# Patient Record
Sex: MALE | Race: WHITE | NOT HISPANIC OR LATINO | Employment: OTHER | URBAN - METROPOLITAN AREA
[De-identification: names, ages, dates, MRNs, and addresses within clinical notes are randomized per-mention and may not be internally consistent; named-entity substitution may affect disease eponyms.]

---

## 2017-01-26 ENCOUNTER — LAB CONVERSION - ENCOUNTER (OUTPATIENT)
Dept: OTHER | Facility: OTHER | Age: 82
End: 2017-01-26

## 2017-01-26 ENCOUNTER — GENERIC CONVERSION - ENCOUNTER (OUTPATIENT)
Dept: OTHER | Facility: OTHER | Age: 82
End: 2017-01-26

## 2017-01-26 ENCOUNTER — APPOINTMENT (OUTPATIENT)
Dept: LAB | Facility: CLINIC | Age: 82
End: 2017-01-26
Payer: MEDICARE

## 2017-01-26 DIAGNOSIS — Z79.01 LONG TERM (CURRENT) USE OF ANTICOAGULANTS: ICD-10-CM

## 2017-01-26 LAB
INR PPP: 2.55 (ref 0.86–1.16)
PROTHROMBIN TIME: 27 SECONDS (ref 12–14.3)

## 2017-01-26 PROCEDURE — 36415 COLL VENOUS BLD VENIPUNCTURE: CPT

## 2017-01-26 PROCEDURE — 85610 PROTHROMBIN TIME: CPT

## 2017-02-23 ENCOUNTER — GENERIC CONVERSION - ENCOUNTER (OUTPATIENT)
Dept: OTHER | Facility: OTHER | Age: 82
End: 2017-02-23

## 2017-02-23 ENCOUNTER — APPOINTMENT (OUTPATIENT)
Dept: LAB | Facility: CLINIC | Age: 82
End: 2017-02-23
Payer: MEDICARE

## 2017-02-23 ENCOUNTER — TRANSCRIBE ORDERS (OUTPATIENT)
Dept: LAB | Facility: CLINIC | Age: 82
End: 2017-02-23

## 2017-02-23 DIAGNOSIS — Z79.01 LONG TERM (CURRENT) USE OF ANTICOAGULANTS: ICD-10-CM

## 2017-02-23 LAB
INR PPP: 2.24 (ref 0.86–1.16)
PROTHROMBIN TIME: 24.5 SECONDS (ref 12–14.3)

## 2017-02-23 PROCEDURE — 36415 COLL VENOUS BLD VENIPUNCTURE: CPT

## 2017-02-23 PROCEDURE — 85610 PROTHROMBIN TIME: CPT

## 2017-03-23 ENCOUNTER — GENERIC CONVERSION - ENCOUNTER (OUTPATIENT)
Dept: OTHER | Facility: OTHER | Age: 82
End: 2017-03-23

## 2017-03-23 ENCOUNTER — APPOINTMENT (OUTPATIENT)
Dept: LAB | Facility: CLINIC | Age: 82
End: 2017-03-23
Payer: MEDICARE

## 2017-03-23 DIAGNOSIS — Z79.01 LONG TERM (CURRENT) USE OF ANTICOAGULANTS: ICD-10-CM

## 2017-03-23 DIAGNOSIS — I48.91 ATRIAL FIBRILLATION (HCC): ICD-10-CM

## 2017-03-23 LAB
INR PPP: 2.05 (ref 0.86–1.16)
PROTHROMBIN TIME: 22.9 SECONDS (ref 12–14.3)

## 2017-03-23 PROCEDURE — 85610 PROTHROMBIN TIME: CPT

## 2017-03-23 PROCEDURE — 36415 COLL VENOUS BLD VENIPUNCTURE: CPT

## 2017-04-20 ENCOUNTER — TRANSCRIBE ORDERS (OUTPATIENT)
Dept: LAB | Facility: CLINIC | Age: 82
End: 2017-04-20

## 2017-04-20 ENCOUNTER — APPOINTMENT (OUTPATIENT)
Dept: LAB | Facility: CLINIC | Age: 82
End: 2017-04-20
Payer: MEDICARE

## 2017-04-20 ENCOUNTER — GENERIC CONVERSION - ENCOUNTER (OUTPATIENT)
Dept: OTHER | Facility: OTHER | Age: 82
End: 2017-04-20

## 2017-04-20 DIAGNOSIS — I48.91 ATRIAL FIBRILLATION (HCC): ICD-10-CM

## 2017-04-20 LAB
INR PPP: 2.1 (ref 0.86–1.16)
PROTHROMBIN TIME: 23.3 SECONDS (ref 12–14.3)

## 2017-04-20 PROCEDURE — 85610 PROTHROMBIN TIME: CPT

## 2017-04-20 PROCEDURE — 36415 COLL VENOUS BLD VENIPUNCTURE: CPT

## 2017-05-18 ENCOUNTER — GENERIC CONVERSION - ENCOUNTER (OUTPATIENT)
Dept: OTHER | Facility: OTHER | Age: 82
End: 2017-05-18

## 2017-05-18 ENCOUNTER — APPOINTMENT (OUTPATIENT)
Dept: LAB | Facility: CLINIC | Age: 82
End: 2017-05-18
Payer: MEDICARE

## 2017-05-18 DIAGNOSIS — Z79.01 LONG TERM (CURRENT) USE OF ANTICOAGULANTS: ICD-10-CM

## 2017-05-18 LAB
INR PPP: 1.82 (ref 0.86–1.16)
PROTHROMBIN TIME: 21.2 SECONDS (ref 12.1–14.4)

## 2017-05-18 PROCEDURE — 36415 COLL VENOUS BLD VENIPUNCTURE: CPT

## 2017-05-18 PROCEDURE — 85610 PROTHROMBIN TIME: CPT

## 2017-06-15 ENCOUNTER — GENERIC CONVERSION - ENCOUNTER (OUTPATIENT)
Dept: OTHER | Facility: OTHER | Age: 82
End: 2017-06-15

## 2017-06-15 ENCOUNTER — APPOINTMENT (OUTPATIENT)
Dept: LAB | Facility: CLINIC | Age: 82
End: 2017-06-15
Payer: MEDICARE

## 2017-06-15 ENCOUNTER — TRANSCRIBE ORDERS (OUTPATIENT)
Dept: LAB | Facility: CLINIC | Age: 82
End: 2017-06-15

## 2017-06-15 DIAGNOSIS — Z79.01 LONG TERM (CURRENT) USE OF ANTICOAGULANTS: ICD-10-CM

## 2017-06-15 LAB
INR PPP: 2.09 (ref 0.86–1.16)
PROTHROMBIN TIME: 23.7 SECONDS (ref 12.1–14.4)

## 2017-06-15 PROCEDURE — 36415 COLL VENOUS BLD VENIPUNCTURE: CPT

## 2017-06-15 PROCEDURE — 85610 PROTHROMBIN TIME: CPT

## 2017-07-13 ENCOUNTER — APPOINTMENT (OUTPATIENT)
Dept: LAB | Facility: CLINIC | Age: 82
End: 2017-07-13
Payer: MEDICARE

## 2017-07-13 ENCOUNTER — GENERIC CONVERSION - ENCOUNTER (OUTPATIENT)
Dept: OTHER | Facility: OTHER | Age: 82
End: 2017-07-13

## 2017-07-13 DIAGNOSIS — Z79.01 LONG TERM (CURRENT) USE OF ANTICOAGULANTS: ICD-10-CM

## 2017-07-13 LAB
INR PPP: 2.22 (ref 0.86–1.16)
PROTHROMBIN TIME: 24.9 SECONDS (ref 12.1–14.4)

## 2017-07-13 PROCEDURE — 85610 PROTHROMBIN TIME: CPT

## 2017-07-13 PROCEDURE — 36415 COLL VENOUS BLD VENIPUNCTURE: CPT

## 2017-08-10 ENCOUNTER — GENERIC CONVERSION - ENCOUNTER (OUTPATIENT)
Dept: OTHER | Facility: OTHER | Age: 82
End: 2017-08-10

## 2017-08-10 ENCOUNTER — APPOINTMENT (OUTPATIENT)
Dept: LAB | Facility: CLINIC | Age: 82
End: 2017-08-10
Payer: MEDICARE

## 2017-08-10 ENCOUNTER — TRANSCRIBE ORDERS (OUTPATIENT)
Dept: LAB | Facility: CLINIC | Age: 82
End: 2017-08-10

## 2017-08-10 DIAGNOSIS — Z79.01 LONG TERM (CURRENT) USE OF ANTICOAGULANTS: ICD-10-CM

## 2017-08-10 LAB
INR PPP: 2.08 (ref 0.86–1.16)
PROTHROMBIN TIME: 23.6 SECONDS (ref 12.1–14.4)

## 2017-08-10 PROCEDURE — 36415 COLL VENOUS BLD VENIPUNCTURE: CPT

## 2017-08-10 PROCEDURE — 85610 PROTHROMBIN TIME: CPT

## 2017-08-11 ENCOUNTER — GENERIC CONVERSION - ENCOUNTER (OUTPATIENT)
Dept: OTHER | Facility: OTHER | Age: 82
End: 2017-08-11

## 2017-09-07 ENCOUNTER — GENERIC CONVERSION - ENCOUNTER (OUTPATIENT)
Dept: OTHER | Facility: OTHER | Age: 82
End: 2017-09-07

## 2017-09-07 ENCOUNTER — APPOINTMENT (OUTPATIENT)
Dept: LAB | Facility: CLINIC | Age: 82
End: 2017-09-07
Payer: MEDICARE

## 2017-09-07 DIAGNOSIS — Z79.01 LONG TERM (CURRENT) USE OF ANTICOAGULANTS: ICD-10-CM

## 2017-09-07 LAB
INR PPP: 2.17 (ref 0.86–1.16)
PROTHROMBIN TIME: 24.4 SECONDS (ref 12.1–14.4)

## 2017-09-07 PROCEDURE — 85610 PROTHROMBIN TIME: CPT

## 2017-09-07 PROCEDURE — 36415 COLL VENOUS BLD VENIPUNCTURE: CPT

## 2017-09-19 ENCOUNTER — TRANSCRIBE ORDERS (OUTPATIENT)
Dept: SLEEP CENTER | Facility: CLINIC | Age: 82
End: 2017-09-19

## 2017-09-19 DIAGNOSIS — G47.33 OSA (OBSTRUCTIVE SLEEP APNEA): Primary | ICD-10-CM

## 2017-09-22 ENCOUNTER — TRANSCRIBE ORDERS (OUTPATIENT)
Dept: SLEEP CENTER | Facility: CLINIC | Age: 82
End: 2017-09-22

## 2017-09-22 DIAGNOSIS — G47.33 OSA (OBSTRUCTIVE SLEEP APNEA): Primary | ICD-10-CM

## 2017-10-10 ENCOUNTER — GENERIC CONVERSION - ENCOUNTER (OUTPATIENT)
Dept: OTHER | Facility: OTHER | Age: 82
End: 2017-10-10

## 2017-10-10 ENCOUNTER — TRANSCRIBE ORDERS (OUTPATIENT)
Dept: LAB | Facility: CLINIC | Age: 82
End: 2017-10-10

## 2017-10-10 ENCOUNTER — APPOINTMENT (OUTPATIENT)
Dept: LAB | Facility: CLINIC | Age: 82
End: 2017-10-10
Payer: MEDICARE

## 2017-10-10 DIAGNOSIS — Z79.01 LONG-TERM (CURRENT) USE OF ANTICOAGULANTS: ICD-10-CM

## 2017-10-10 DIAGNOSIS — Z79.01 LONG-TERM (CURRENT) USE OF ANTICOAGULANTS: Primary | ICD-10-CM

## 2017-10-10 LAB
INR PPP: 2.25 (ref 0.86–1.16)
PROTHROMBIN TIME: 25.1 SECONDS (ref 12.1–14.4)

## 2017-10-10 PROCEDURE — 85610 PROTHROMBIN TIME: CPT

## 2017-10-10 PROCEDURE — 36415 COLL VENOUS BLD VENIPUNCTURE: CPT

## 2017-10-17 ENCOUNTER — ALLSCRIPTS OFFICE VISIT (OUTPATIENT)
Dept: OTHER | Facility: OTHER | Age: 82
End: 2017-10-17

## 2017-10-19 ENCOUNTER — HOSPITAL ENCOUNTER (OUTPATIENT)
Dept: SLEEP CENTER | Facility: CLINIC | Age: 82
Discharge: HOME | End: 2017-10-19
Payer: MEDICARE

## 2017-10-19 DIAGNOSIS — G47.33 OSA (OBSTRUCTIVE SLEEP APNEA): ICD-10-CM

## 2017-10-19 PROCEDURE — 95811 POLYSOM 6/>YRS CPAP 4/> PARM: CPT

## 2017-11-16 ENCOUNTER — TRANSCRIBE ORDERS (OUTPATIENT)
Dept: SLEEP CENTER | Facility: CLINIC | Age: 82
End: 2017-11-16

## 2017-11-16 ENCOUNTER — HOSPITAL ENCOUNTER (EMERGENCY)
Facility: HOSPITAL | Age: 82
Discharge: HOME | End: 2017-11-17
Admitting: EMERGENCY MEDICINE
Payer: MEDICARE

## 2017-11-16 ENCOUNTER — APPOINTMENT (EMERGENCY)
Dept: CT IMAGING | Facility: HOSPITAL | Age: 82
End: 2017-11-16
Payer: MEDICARE

## 2017-11-16 ENCOUNTER — HOSPITAL ENCOUNTER (OUTPATIENT)
Dept: SLEEP CENTER | Facility: CLINIC | Age: 82
Discharge: HOME | End: 2017-11-16
Payer: MEDICARE

## 2017-11-16 VITALS
DIASTOLIC BLOOD PRESSURE: 82 MMHG | WEIGHT: 190.48 LBS | TEMPERATURE: 98.8 F | HEART RATE: 75 BPM | RESPIRATION RATE: 16 BRPM | OXYGEN SATURATION: 96 % | SYSTOLIC BLOOD PRESSURE: 145 MMHG

## 2017-11-16 DIAGNOSIS — G47.23 IRREGULAR SLEEP-WAKE RHYTHM: Primary | ICD-10-CM

## 2017-11-16 DIAGNOSIS — S09.90XA CLOSED HEAD INJURY: ICD-10-CM

## 2017-11-16 DIAGNOSIS — R29.6 FALL IN ELDERLY PATIENT: Primary | ICD-10-CM

## 2017-11-16 DIAGNOSIS — R04.0 BLEEDING NOSE: ICD-10-CM

## 2017-11-16 DIAGNOSIS — G47.33 OSA (OBSTRUCTIVE SLEEP APNEA): ICD-10-CM

## 2017-11-16 PROCEDURE — 72125 CT NECK SPINE W/O DYE: CPT

## 2017-11-16 PROCEDURE — 85730 THROMBOPLASTIN TIME PARTIAL: CPT | Performed by: PHYSICIAN ASSISTANT

## 2017-11-16 PROCEDURE — 36415 COLL VENOUS BLD VENIPUNCTURE: CPT | Performed by: PHYSICIAN ASSISTANT

## 2017-11-16 PROCEDURE — 70450 CT HEAD/BRAIN W/O DYE: CPT

## 2017-11-16 PROCEDURE — 85610 PROTHROMBIN TIME: CPT | Performed by: PHYSICIAN ASSISTANT

## 2017-11-16 RX ORDER — WARFARIN SODIUM 5 MG/1
2.5 TABLET ORAL DAILY
COMMUNITY
Start: 2017-02-13 | End: 2019-01-21 | Stop reason: ALTCHOICE

## 2017-11-16 RX ORDER — OXYMETAZOLINE HYDROCHLORIDE 0.05 G/100ML
2 SPRAY NASAL ONCE
Status: COMPLETED | OUTPATIENT
Start: 2017-11-16 | End: 2017-11-16

## 2017-11-16 RX ORDER — EZETIMIBE AND SIMVASTATIN 10; 20 MG/1; MG/1
1 TABLET ORAL DAILY
COMMUNITY
End: 2019-03-29 | Stop reason: ALTCHOICE

## 2017-11-16 RX ADMIN — OXYMETAZOLINE HYDROCHLORIDE 2 SPRAY: 5 SPRAY NASAL at 23:01

## 2017-11-16 NOTE — CONSULTS
Consultation - [facility] 80 y o  male MRN: [id number]          Reason for Consult / Principal Problem:    1  Frequent nocturnal awakenings  2  Excessive daytime sleepiness  3  History of Obstructive Sleep Apnea  4  Noncompliance with nasal CPAP     HPI: Tg Brunner is a 80y o  year old male  He was originally diagnosed with obstructive sleep apnea on studies performed at [facility] many years ago  He was placed on nasal CPAP number was reportedly using 10 cm of water pressure with good results  He retired approximately 33 years ago and lived at home with his wife until moving to [place] in   He then spent 4 years caring for his wife who had developed Alzheimer's Disease  She  in May of 2014 after suffering a stroke  His daughter feels that he discontinued CPAP while he was caring for his wife  He currently lives alone in his apartment  His major complaint at this time is multiple nocturnal awakenings  He also tends to nap off and on all day long  Employment:  He retired from the [employer] shift of [address] [place] [address] at age 64  Prior to that he was also involved in the [employer]    Sleep Schedule:       Bedtime:  8:30 p m  Latency:  30 minutes      Wakeup time:  5:30 a m  to 6:00 a m  Awakenings:       Frequency:  3 to 5 times per night      Causes:  No obvious cause, typically uses the bathroom      Duration:  15 to 30 minutes    Daytime Sleepiness / Inappropriate Sleep:       Most severe: Throughout the day       Naps :  Approximately 5 times a day   Time:  His first nap is after awakening before breakfast   He naps again sometime after breakfast and intermittently after lunch until bedtime  Duration:  30 to 60 minutes, he typically deliberately naps when sitting in his recliner  He does not typically go back to bed      Inappropriate drowsiness / sleep:   Throughout the day    Snoring:  Fairly loud when not wearing CPAP    Apnea:  Previously described when not wearing CPAP    Change in Weight:  No significant change over the past year    RLS:  No clinical symptoms consistent with this diagnosis     Other Complaints:  Poor balance, bilateral hearing loss, atrial fibrillation     Social History:      Caffeine:  Denied       Tobacco:  Denied       Alcohol:  Denied       Drugs:  Denied     Past medical, past surgical,and family history can be reviewed in the patient's chart  Allergies and medications can be reviewed in the patient's chart  Signs         Weight:    186 lb (84 5 kg)  Height:    66 inches  BMI:     29 8   Blood Pressure:   122/60  Heart Rate:    68  Neck Circumference:  42 cm  ESS:     11    Physical Exam  General Appearance:   Alert, cooperative, no distress, appears somewhat younger than his stated age, somewhat overweight, using single-point cane for balance during ambulation  Head:   Normocephalic, without obvious abnormality, atraumatic     Eyes:   PERRL, conjunctiva/corneas clear, EOM's intact          Nose:  Nares normal, septum midline, mucosa normal, no drainage or sinus tenderness     Throat:  Lips, teeth and gums normal; tongue somewhat increased in size but normal in shape and midline in position; mucosa significantly redundant bilaterally, uvula difficult to observe, tonsils not visible, Mallampati class 4       Neck:  Supple, symmetrical, trachea midline, no adenopathy;  Thyroid: No enlargement, tenderness or nodules; no carotid bruit or JVD   Lungs:    Clear to auscultation bilaterally, respirations unlabored     Heart:   Irregular rate and rhythm consistent with atrial fibrillation, S1 and S2 normal, no murmur, rub or gallop       Extremities:  Extremities normal, atraumatic, no cyanosis or edema     Pulses:  2+ and symmetric all extremities     Skin:  Skin color, texture, turgor slightly decreased, no rashes or lesions     Lymph nodes:      Cervical and supraclavicular normal Neurologic:    CNII-XII intact  Decreased proximal strength in both lower extremities, decreased appreciation for pin in the distal aspect of all extremities (legs worse than arms), difficulty arising from a chair unassisted, mild unsteadiness when standing without a cane, significant unsteadiness in Romberg position with eyes closed, decreased reflexes in all 4 extremities, absent at wrists and ankles bilaterally     Sleep Study Results:  A titration study was completed at this laboratory in October of this year  Results showed fairly good response to nasal CPAP at 11 cm of water  ASSESSMENT / PLAN     Assessment:  1  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  2  Obstructive Sleep Apnea  3  History of atrial fibrillation  4  Bilateral hearing loss  5  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    Plan:  1  Continue nasal CPAP  2  Have CPAP increased to 11 cm of water  3  Increase daily activities as tolerated  4  Avoid sedative medication as much as possible    Counseling / Coordination of Care  Total clinic time spent today 60 minutes  Greater than 50% of total time was spent with the patient and / or family counseling and / or coordination of care  A description of the counseling / coordination of care: I spent a great deal of time with the patient and his daughter discussing his current symptoms  His daughter reports that when he is up and out of his apartment for the day his nighttime sleep is much more consolidated  He apparently has no difficulty maintaining wakefulness if he is capped occupied  He does not take part in many activities at his current residence  As a result he is spends most of his time during the day alone in his apartment  During this time he tends to nap intermittently, most likely due to boredom  I have told the patient and his daughter that this represents a type of circadian rhythm disorder    He has no set schedule and does not partake in a significant amount of activity  This leaves of a large segment of his day available for sedentary activity during which time he naps  This daytime napping then causes significant fragmentation of nighttime sleep  As his daughter has noted if he has kept up inactive throughout the day he is much more likely to sleep through the night  I have suggested that he use nasal CPAP nightly as treatment for obstructive sleep apnea  This would likely give him a bit more energy during the day  If however he does not stay active his problem will continue unchanged  Both he and his daughter understand my assessment and will attempt to make changes as possible  He will have his CPAP equipment set to 11 cm water  He recently acquired a new CPAP machine and supplies  This occurred in March of this year which means that he will not require new equipment or supplies at this time  I have written a prescription for new supplies over the next 12 months  I have asked him to return in 6 months for routine re-evaluation  I have also suggested to his daughter that she contact me if any new problems arise or if she requires further instruction as to how his situation should be handled                Geoffrey Best DO    Board Certified in Sleep Disorders Medicine

## 2017-11-17 LAB
APTT PPP: 58 SECONDS (ref 23–35)
INR PPP: 2.36 (ref 0.86–1.16)
PROTHROMBIN TIME: 26.1 SECONDS (ref 12.1–14.4)

## 2017-11-17 PROCEDURE — 99284 EMERGENCY DEPT VISIT MOD MDM: CPT

## 2017-11-17 NOTE — DISCHARGE INSTRUCTIONS
Head Injury   WHAT YOU NEED TO KNOW:   A head injury is most often caused by a blow to the head  This may occur from a fall, bicycle injury, sports injury, being struck in the head, or a motor vehicle accident  DISCHARGE INSTRUCTIONS:   Call 911 or have someone else call for any of the following:   · You cannot be woken  · You have a seizure  · You stop responding to others or you faint  · You have blurry or double vision  · Your speech becomes slurred or confused  · You have arm or leg weakness, loss of feeling, or new problems with coordination  · Your pupils are larger than usual or one pupil is a different size than the other  · You have blood or clear fluid coming out of your ears or nose  Return to the emergency department if:   · You have repeated or forceful vomiting  · You feel confused  · Your headache gets worse or becomes severe  · You or someone caring for you notices that you are harder to wake than usual   Contact your healthcare provider if:   · Your symptoms last longer than 6 weeks after the injury  · You have questions or concerns about your condition or care  Medicines:   · Acetaminophen  decreases pain  Acetaminophen is available without a doctor's order  Ask how much to take and how often to take it  Follow directions  Acetaminophen can cause liver damage if not taken correctly  · Take your medicine as directed  Contact your healthcare provider if you think your medicine is not helping or if you have side effects  Tell him or her if you are allergic to any medicine  Keep a list of the medicines, vitamins, and herbs you take  Include the amounts, and when and why you take them  Bring the list or the pill bottles to follow-up visits  Carry your medicine list with you in case of an emergency  Self-care:   · Rest  or do quiet activities for 24 to 48 hours   Limit your time watching TV, using the computer, or doing tasks that require a lot of thinking  Slowly return to your normal activities as directed  Do not play sports or do activities that may cause you to get hit in the head  Ask your healthcare provider when you can return to sports  · Apply ice  on your head for 15 to 20 minutes every hour or as directed  Use an ice pack, or put crushed ice in a plastic bag  Cover it with a towel before you apply it to your skin  Ice helps prevent tissue damage and decreases swelling and pain  · Have someone stay with you for 24 hours  or as directed  This person can monitor you for complications and call [phone]  When you are awake the person should ask you a few questions to see if you are thinking clearly  An example would be to ask your name or your address  Prevent another head injury:   · Wear a helmet that fits properly  Do this when you play sports, or ride a bike, scooter, or skateboard  Helmets help decrease your risk of a serious head injury  Talk to your healthcare provider about other ways you can protect yourself if you play sports  · Wear your seat belt every time you are in a car  This helps to decrease your risk for a head injury if you are in a car accident  Follow up with your healthcare provider as directed:  Write down your questions so you remember to ask them during your visits  © 2017 [employer]  Information is for End User's use only and may not be sold, redistributed or otherwise used for commercial purposes  All illustrations and images included in CareNotes® are the copyrighted property of A D A M , Inc  or [employer]  The above information is an  only  It is not intended as medical advice for individual conditions or treatments  Talk to your doctor, nurse or pharmacist before following any medical regimen to see if it is safe and effective for you  Epistaxis   WHAT YOU SHOULD KNOW:   Epistaxis is a nosebleed   A nosebleed occurs when the blood vessels near the surface of the nasal cavity are injured or damaged  AFTER YOU LEAVE:   Medicines:   · Nasal sprays:  Vasoconstrictor nasal spray is a medicine that helps make nasal blood vessels narrower  This limits the blood flow and stops the bleeding  This medicine also decreases the swelling inside your nose and helps you breathe easier  You may also be directed to use saline or other nasal sprays to add moisture to your nose  · Antibiotics: This medicine is given to help treat or prevent an infection caused by bacteria  · Take your medicine as directed  Call your healthcare provider if you think your medicine is not helping or if you have side effects  Tell him if you are allergic to any medicine  Keep a list of the medicines, vitamins, and herbs you take  Include the amounts, and when and why you take them  Bring the list or the pill bottles to follow-up visits  Carry your medicine list with you in case of an emergency  Follow up with your primary healthcare provider or otolaryngologist within 2 to 3 days or as directed: Any packing in your nose should be removed within 2 to 3 days  Write down your questions so you remember to ask them during your visits  First aid:   · Sit up and lean forward: This will help prevent you from swallowing blood  Spit blood and saliva into a bowl  · Apply pressure to your nose:  Use 2 fingers to pinch your nose shut for 10 minutes  This will help stop the bleeding  Breathe through your mouth  · Apply ice:  Use a cold pack or put crushed ice in a bag, cover with a towel, and place on the bridge of your nose  · Nasal packing:  Pack your nose with a cotton ball, tissue, tampon, or gauze bandage to stop the bleeding  Prevent epistaxis:  · Avoid nose picking and blowing your nose too hard: You can irritate or damage your nose if you pick it  Blowing your nose too hard may cause the bleeding to start again      · Avoid irritants:  Substances that can irritate your nose should be avoided  These include tobacco smoke and chemical sprays such as  that contain ammonia  · Use a cool mist humidifier in your home: This will add the moisture to the air and help keep your nose moist      · Put a small amount of petroleum jelly inside your nostrils: You may apply a small amount of petroleum jelly if you do not have a nasal packing  This will help keep your nose from drying out or getting irritated  Do not put anything else inside your nose unless your primary healthcare provider tells you to do so  Contact your primary healthcare provider or otolaryngologist if:   · You have a fever and are vomiting  · You have pain in and around your nose that is getting worse even after you take pain medicines  · Your nasal pack is loose  · You have questions or concerns about your condition or care  Seek care immediately if:   · Your nasal packing is soaked with blood  · Your nose is still bleeding after 20 minutes, even after you pinch it  · You have a foul-smelling discharge coming out of your nose  · You feel so weak and dizzy that you have trouble standing up  · You have trouble breathing or talking  © 2014 [employer] [employer] is for End User's use only and may not be sold, redistributed or otherwise used for commercial purposes  All illustrations and images included in CareNotes® are the copyrighted property of A [employer] A [email or web address]  or [employer]  The above information is an  only  It is not intended as medical advice for individual conditions or treatments  Talk to your doctor, nurse or pharmacist before following any medical regimen to see if it is safe and effective for you

## 2017-11-17 NOTE — ED PROVIDER NOTES
History  Chief Complaint   Patient presents with    Fall     Patient arrives via EMS from [place]  Per ems patient stood up from couch and tripped hitting face on floor  patient takes coumadin  Abrasion noted to nose  Denies LOC, dizziness, headache  A/Ox4 on arrival        80year-old male presents emergency department status post trip and fall this evening  Patient did strike face  No loss of consciousness  Patient does take Coumadin for atrial fibrillation  Patient denies loss consciousness neck pain visual changes  At this time will obtain CT head and neck  Fall   Mechanism of injury: fall    Injury location:  Face  Incident location:  Home  Fall:     Fall occurred:  [facility] of impact:  Face  Protective equipment: none    Suspicion of alcohol use: no    Suspicion of drug use: no    Tetanus status:  Up to date  Prior to arrival data:     Bystander interventions:  None    Patient ambulatory at scene: yes      Blood loss:  Minimal    Responsiveness at scene:  Alert    Orientation at scene:  Person, place, situation and time  Associated symptoms: no back pain, no chest pain and no neck pain        Prior to Admission Medications   Prescriptions Last Dose Informant Patient Reported? Taking? Multiple Vitamins-Minerals (MULTIVITAMIN ADULT PO)   Yes Yes   Sig: Take by mouth daily   ezetimibe-simvastatin (VYTORIN) 10-20 mg per tablet   Yes Yes   Sig: Take by mouth   warfarin (COUMADIN) 5 mg tablet   Yes Yes   Sig: Take 5 mg by mouth daily      Facility-Administered Medications: None       Past Medical History:   Diagnosis Date    Atrial fibrillation (HCC)     Atrial fibrillation (HCC)     Hyperlipidemia        History reviewed  No pertinent surgical history  History reviewed  No pertinent family history  I have reviewed and agree with the history as documented      Social History   Substance Use Topics    Smoking status: Former Smoker    Smokeless tobacco: Never Used    Alcohol use No Review of Systems   Constitutional: Negative for chills and fever  Eyes: Negative for pain and visual disturbance  Respiratory: Negative for chest tightness  Cardiovascular: Negative for chest pain  Endocrine: Negative for heat intolerance  Genitourinary: Positive for dysuria  Musculoskeletal: Negative for back pain, neck pain and neck stiffness  Neurological: Negative for facial asymmetry  Hematological: Negative for adenopathy  Psychiatric/Behavioral: Negative for agitation  Physical Exam  ED Triage Vitals [11/16/17 2037]   Temperature Pulse Respirations Blood Pressure SpO2   98 8 °F (37 1 °C) 73 18 (!) 184/83 98 %      Temp Source Heart Rate Source Patient Position - Orthostatic VS BP Location FiO2 (%)   Oral Monitor Lying Right arm --      Pain Score       No Pain           Orthostatic Vital Signs  Vitals:    11/16/17 2037 11/16/17 2238   BP: (!) 184/83 145/82   Pulse: 73 75   Patient Position - Orthostatic VS: Lying Lying       Physical Exam   Constitutional: He is oriented to person, place, and time  He appears well-developed and well-nourished  No distress  HENT:   Head: Normocephalic  Right Ear: External ear normal    Left Ear: External ear normal    Mouth/Throat: Oropharynx is clear and moist    No septal hematoma appreciated  Eyes: Conjunctivae are normal  Right eye exhibits no discharge  Left eye exhibits no discharge  Neck: Normal range of motion  Neck supple  No JVD present  No midline posterior cervical tenderness  Cardiovascular: Intact distal pulses  Regularly irregular   Pulmonary/Chest: Effort normal    Abdominal: Soft  Musculoskeletal: Normal range of motion  Lymphadenopathy:     He has no cervical adenopathy  Neurological: He is alert and oriented to person, place, and time  Intact gross cerebellar exam    Skin: Skin is warm  Capillary refill takes less than 2 seconds  He is not diaphoretic  Abrasion on nose as previously noted  Psychiatric: He has a normal mood and affect  ED Medications  Medications   oxymetazoline (AFRIN) 0 05 % nasal spray 2 spray (2 sprays Each Nare Given 11/16/17 2301)       Diagnostic Studies  Results Reviewed     Procedure Component Value Units Date/Time    Protime-INR [[id number]]  (Abnormal) Collected:  11/16/17 2233    Lab Status:  Final result Specimen:  Blood from Arm, Right Updated:  11/17/17 0044     Protime 26 1 (H) seconds      INR 2 36 (H)    APTT [[id number]]  (Abnormal) Collected:  11/16/17 2233    Lab Status:  Final result Specimen:  Blood from Arm, Right Updated:  11/17/17 0044     PTT 58 (H) seconds     Narrative: Therapeutic Heparin Range = 60-90 seconds                 CT head without contrast   Final Result by Madeline Zheng MD (11/16 2304)      1  No acute intracranial pathology  Workstation performed: [id number]         CT cervical spine without contrast   Final Result by Madeline Zheng MD (11/16 2309)      No cervical spine fracture or traumatic malalignment  Workstation performed: [id number]                    Procedures  Procedures       Phone Contacts  ED Phone Contact    ED Course  ED Course as of Nov 17 2234   Thu Nov 16, 2017   2303 CT head without contrast   2304 CT head without contrast   2304 CT head without contrast   2304 CT head without contrast   2304 CT head without contrast                               MDM  Number of Diagnoses or Management Options  Bleeding nose:   Closed head injury:   Fall in elderly patient:   Diagnosis management comments: CT head reviewed  CT cervical spine reviewed  Patient stable  At this time patient is felt stable for discharge to home  Patient and female significant other were educated on diagnosis and home management  Patient and female significant other were educated on persistent or worsening signs symptoms and to follow up with primary care and/or return to the emergency department    Patient and female significant other admit to understanding and agreement  Amount and/or Complexity of Data Reviewed  Clinical lab tests: ordered and reviewed  Tests in the radiology section of CPT®: ordered and reviewed      CritCare Time    Disposition  Final diagnoses:   Fall in elderly patient   Closed head injury   Bleeding nose     Time reflects when diagnosis was documented in both MDM as applicable and the Disposition within this note     Time User Action Codes Description Comment    11/16/2017 11:58 PM Shawna Menon Add [R29 6] Fall in elderly patient     11/16/2017 11:59 PM Shawna Menon Add [S09 90XA] Closed head injury     11/16/2017 11:59 PM Shawna Menon Add [R04 0] Bleeding nose       ED Disposition     ED Disposition Condition Comment    Discharge  Michaela Hands discharge to home/self care  Condition at discharge: Stable        Follow-up Information     Follow up With Specialties Details Why [address] No  [place], MD Family Medicine   [address] Emergency Department Emergency Medicine  If symptoms worsen [address] [facility]  [phone] [facility], [address] [place] [address], [id number]        Discharge Medication List as of 11/17/2017 12:02 AM      CONTINUE these medications which have NOT CHANGED    Details   ezetimibe-simvastatin (VYTORIN) 10-20 mg per tablet Take by mouth, Historical Med      Multiple Vitamins-Minerals (MULTIVITAMIN ADULT PO) Take by mouth daily, Historical Med      warfarin (COUMADIN) 5 mg tablet Take 5 mg by mouth daily, Starting Mon 2/13/2017, Historical Med           No discharge procedures on file      ED Provider  Electronically Signed by           Karolina Lazo PA-C  11/17/17 7714

## 2017-11-27 ENCOUNTER — APPOINTMENT (EMERGENCY)
Dept: CT IMAGING | Facility: HOSPITAL | Age: 82
DRG: 092 | End: 2017-11-27
Payer: MEDICARE

## 2017-11-27 ENCOUNTER — HOSPITAL ENCOUNTER (EMERGENCY)
Facility: HOSPITAL | Age: 82
Discharge: HOME | DRG: 092 | End: 2017-11-27
Attending: EMERGENCY MEDICINE | Admitting: EMERGENCY MEDICINE
Payer: MEDICARE

## 2017-11-27 VITALS
SYSTOLIC BLOOD PRESSURE: 135 MMHG | RESPIRATION RATE: 18 BRPM | OXYGEN SATURATION: 96 % | HEART RATE: 77 BPM | TEMPERATURE: 97.9 F | WEIGHT: 185 LBS | DIASTOLIC BLOOD PRESSURE: 55 MMHG

## 2017-11-27 DIAGNOSIS — S22.32XA CLOSED FRACTURE OF ONE RIB OF LEFT SIDE, INITIAL ENCOUNTER: Primary | ICD-10-CM

## 2017-11-27 LAB
ANION GAP SERPL CALCULATED.3IONS-SCNC: 7 MMOL/L (ref 4–13)
APTT PPP: 81 SECONDS (ref 23–35)
BACTERIA UR QL AUTO: ABNORMAL /HPF
BASOPHILS # BLD AUTO: 0.06 THOUSANDS/ΜL (ref 0–0.1)
BASOPHILS NFR BLD AUTO: 1 % (ref 0–1)
BILIRUB UR QL STRIP: ABNORMAL
BUN SERPL-MCNC: 16 MG/DL (ref 5–25)
CALCIUM SERPL-MCNC: 9.2 MG/DL (ref 8.3–10.1)
CHLORIDE SERPL-SCNC: 103 MMOL/L (ref 100–108)
CLARITY UR: CLEAR
CO2 SERPL-SCNC: 31 MMOL/L (ref 21–32)
COLOR UR: YELLOW
CREAT SERPL-MCNC: 1.15 MG/DL (ref 0.6–1.3)
EOSINOPHIL # BLD AUTO: 0.56 THOUSAND/ΜL (ref 0–0.61)
EOSINOPHIL NFR BLD AUTO: 7 % (ref 0–6)
ERYTHROCYTE [DISTWIDTH] IN BLOOD BY AUTOMATED COUNT: 14.5 % (ref 11.6–15.1)
GFR SERPL CREATININE-BSD FRML MDRD: 54 ML/MIN/1.73SQ M
GLUCOSE SERPL-MCNC: 110 MG/DL (ref 65–140)
GLUCOSE UR STRIP-MCNC: NEGATIVE MG/DL
HCT VFR BLD AUTO: 43.7 % (ref 36.5–49.3)
HGB BLD-MCNC: 14.8 G/DL (ref 12–17)
HGB UR QL STRIP.AUTO: NEGATIVE
INR PPP: 3.16 (ref 0.86–1.16)
KETONES UR STRIP-MCNC: ABNORMAL MG/DL
LEUKOCYTE ESTERASE UR QL STRIP: ABNORMAL
LYMPHOCYTES # BLD AUTO: 2.23 THOUSANDS/ΜL (ref 0.6–4.47)
LYMPHOCYTES NFR BLD AUTO: 29 % (ref 14–44)
MCH RBC QN AUTO: 32.3 PG (ref 26.8–34.3)
MCHC RBC AUTO-ENTMCNC: 33.9 G/DL (ref 31.4–37.4)
MCV RBC AUTO: 95 FL (ref 82–98)
MONOCYTES # BLD AUTO: 0.72 THOUSAND/ΜL (ref 0.17–1.22)
MONOCYTES NFR BLD AUTO: 9 % (ref 4–12)
NEUTROPHILS # BLD AUTO: 4.14 THOUSANDS/ΜL (ref 1.85–7.62)
NEUTS SEG NFR BLD AUTO: 54 % (ref 43–75)
NITRITE UR QL STRIP: NEGATIVE
NON-SQ EPI CELLS URNS QL MICRO: ABNORMAL /HPF
NRBC BLD AUTO-RTO: 0 /100 WBCS
PH UR STRIP.AUTO: 5 [PH] (ref 4.5–8)
PLATELET # BLD AUTO: 231 THOUSANDS/UL (ref 149–390)
PMV BLD AUTO: 9.3 FL (ref 8.9–12.7)
POTASSIUM SERPL-SCNC: 4.8 MMOL/L (ref 3.5–5.3)
PROT UR STRIP-MCNC: ABNORMAL MG/DL
PROTHROMBIN TIME: 32.9 SECONDS (ref 12.1–14.4)
RBC # BLD AUTO: 4.58 MILLION/UL (ref 3.88–5.62)
RBC #/AREA URNS AUTO: ABNORMAL /HPF
SODIUM SERPL-SCNC: 141 MMOL/L (ref 136–145)
SP GR UR STRIP.AUTO: 1.02 (ref 1–1.03)
UROBILINOGEN UR QL STRIP.AUTO: 0.2 E.U./DL
WBC # BLD AUTO: 7.71 THOUSAND/UL (ref 4.31–10.16)
WBC #/AREA URNS AUTO: ABNORMAL /HPF

## 2017-11-27 PROCEDURE — 80048 BASIC METABOLIC PNL TOTAL CA: CPT | Performed by: EMERGENCY MEDICINE

## 2017-11-27 PROCEDURE — 72125 CT NECK SPINE W/O DYE: CPT

## 2017-11-27 PROCEDURE — 74177 CT ABD & PELVIS W/CONTRAST: CPT

## 2017-11-27 PROCEDURE — 70450 CT HEAD/BRAIN W/O DYE: CPT

## 2017-11-27 PROCEDURE — 85730 THROMBOPLASTIN TIME PARTIAL: CPT | Performed by: EMERGENCY MEDICINE

## 2017-11-27 PROCEDURE — 96361 HYDRATE IV INFUSION ADD-ON: CPT

## 2017-11-27 PROCEDURE — 85025 COMPLETE CBC W/AUTO DIFF WBC: CPT | Performed by: EMERGENCY MEDICINE

## 2017-11-27 PROCEDURE — 36415 COLL VENOUS BLD VENIPUNCTURE: CPT | Performed by: EMERGENCY MEDICINE

## 2017-11-27 PROCEDURE — 71260 CT THORAX DX C+: CPT

## 2017-11-27 PROCEDURE — 85610 PROTHROMBIN TIME: CPT | Performed by: EMERGENCY MEDICINE

## 2017-11-27 PROCEDURE — 81002 URINALYSIS NONAUTO W/O SCOPE: CPT | Performed by: EMERGENCY MEDICINE

## 2017-11-27 PROCEDURE — 96360 HYDRATION IV INFUSION INIT: CPT

## 2017-11-27 PROCEDURE — 99284 EMERGENCY DEPT VISIT MOD MDM: CPT

## 2017-11-27 PROCEDURE — 81001 URINALYSIS AUTO W/SCOPE: CPT

## 2017-11-27 RX ORDER — ACETAMINOPHEN AND CODEINE PHOSPHATE 300; 60 MG/1; MG/1
1 TABLET ORAL EVERY 4 HOURS PRN
COMMUNITY
End: 2017-11-27 | Stop reason: ALTCHOICE

## 2017-11-27 RX ORDER — HYDROCODONE BITARTRATE AND ACETAMINOPHEN 5; 325 MG/1; MG/1
1 TABLET ORAL EVERY 4 HOURS PRN
Qty: 10 TABLET | Refills: 0 | Status: SHIPPED | OUTPATIENT
Start: 2017-11-27 | End: 2017-12-01 | Stop reason: HOSPADM

## 2017-11-27 RX ADMIN — SODIUM CHLORIDE 1000 ML: 0.9 INJECTION, SOLUTION INTRAVENOUS at 13:30

## 2017-11-27 RX ADMIN — IODIXANOL 100 ML: 320 INJECTION, SOLUTION INTRAVASCULAR at 14:28

## 2017-11-27 NOTE — DISCHARGE INSTRUCTIONS
Rib Fracture   WHAT YOU NEED TO KNOW:   A rib fracture is a crack or break in a rib bone  Rib fractures usually heal within 6 weeks  You should be able to return to normal activities before that time  Do not wrap anything around your body to try to splint your ribs  This can prevent you from taking deep breaths and increases your risk for pneumonia  DISCHARGE INSTRUCTIONS:   Call 911 for any of the following:   · You have trouble breathing  · You have new or increased pain  Return to the emergency department if:   · Your pain does not get better, even after treatment  · You have a fever or a cough  Contact your healthcare provider if:   · You have questions or concerns about your condition or care  Medicines:   · NSAIDs  help decrease swelling and pain  NSAIDs are available without a doctor's order  Ask your healthcare provider which medicine is right for you  Ask how much to take and when to take it  Take as directed  NSAIDs can cause stomach bleeding and kidney problems if not taken correctly  · Prescription pain medicine  may be given  Ask your healthcare provider how to take this medicine safely  Some prescription pain medicines contain acetaminophen  Do not take other medicines that contain acetaminophen without talking to your healthcare provider  Too much acetaminophen may cause liver damage  Prescription pain medicine may cause constipation  Ask your healthcare provider how to prevent or treat constipation  · Take your medicine as directed  Contact your healthcare provider if you think your medicine is not helping or if you have side effects  Tell him or her if you are allergic to any medicine  Keep a list of the medicines, vitamins, and herbs you take  Include the amounts, and when and why you take them  Bring the list or the pill bottles to follow-up visits  Carry your medicine list with you in case of an emergency    Follow up with your healthcare provider as directed:  Write down your questions so you remember to ask them during your visits  Deep breathing:  Deep breathing will decrease your risk for pneumonia  Hug a pillow on the injured side while doing this exercise, to decrease pain  Take a deep breath and hold it for as long as possible  You should let the air out and then cough strongly  Deep breaths help open your airway  You may be given an incentive spirometer to help take deep breaths  Put the plastic piece in your mouth  Take a slow, deep breath  You should then let the air out and cough  Repeat these steps 10 times every hour  Rest:  Rest and limit activity to decrease swelling and pain, and allow your injury to heal  Avoid activities that may cause more pain or damage to your ribs such as, pulling, pushing, and lifting  As your pain decreases, begin movements slowly  Take short walks between rest periods  Ice:  Apply ice on the fractured area for 15 to 20 minutes every hour or as directed  Use an ice pack or put crushed ice in a plastic bag  Cover it with a towel  Ice helps prevent tissue damage and decreases swelling and pain  © 2017 [facility] Information is for End User's use only and may not be sold, redistributed or otherwise used for commercial purposes  All illustrations and images included in CareNotes® are the copyrighted property of [employer]  or [employer]  The above information is an  only  It is not intended as medical advice for individual conditions or treatments  Talk to your doctor, nurse or pharmacist before following any medical regimen to see if it is safe and effective for you

## 2017-11-27 NOTE — ED PROVIDER NOTES
History  Chief Complaint   Patient presents with    Back Pain     went from seated position, chair leg broke, fell onto carpeted surface, left sided back/rib pain, bruise on left flank  80-year-old male with a history of atrial fibrillation on Coumadin and hyperlipidemia presents from an assisted living facility for evaluation of back and rib pain after a fall on November 23rd  Patient states he was sitting on a pablo chair and was standing to get up when the leg of the chair broke causing him to fall directly onto his buttocks and back  States he did not strike his head but laid there on the floor for 13 hours until his relative found him on Thanksgiving morning  He states that he did not call for help because he did not want to come to the emergency department  He spoke with his doctor on the phone and was prescribed Tylenol with codeine which has not helped  He continued to complain of lower back pain and rib pain but repeatedly refused to come to the emergency department  Today his relative was going to take him to the doctor's office, but it was closed and so they decided to come here  He is not complaining of any headaches, abdominal pain, chest pain or shortness of breath  No nausea or vomiting  Has been ambulatory with pain  History provided by:  Patient and relative   used: No    Back Pain   Location:  Lumbar spine  Quality:  Aching  Radiates to:  Does not radiate  Pain severity:  Mild  Pain is:  Same all the time  Onset quality:  Gradual  Duration:  4 days  Timing:  Constant  Progression:  Unchanged  Chronicity:  New  Context: falling    Relieved by:  Nothing  Worsened by: Movement  Ineffective treatments: Tylenol with codeine    Associated symptoms: no abdominal pain, no abdominal swelling, no bladder incontinence, no bowel incontinence, no chest pain, no dysuria, no fever, no headaches, no leg pain, no numbness, no paresthesias, no pelvic pain, no perianal numbness, no tingling and no weakness    Risk factors: obesity        Prior to Admission Medications   Prescriptions Last Dose Informant Patient Reported? Taking? Multiple Vitamins-Minerals (MULTIVITAMIN ADULT PO) 11/27/2017 at Unknown time  Yes Yes   Sig: Take by mouth daily   acetaminophen-codeine (TYLENOL with CODEINE #4) 300-60 MG per tablet 11/27/2017 at Unknown time  Yes Yes   Sig: Take 1 tablet by mouth every 4 (four) hours as needed for moderate pain   ezetimibe-simvastatin (VYTORIN) 10-20 mg per tablet 11/27/2017 at Unknown time  Yes Yes   Sig: Take by mouth   warfarin (COUMADIN) 5 mg tablet 11/26/2017 at Unknown time  Yes Yes   Sig: Take 5 mg by mouth daily      Facility-Administered Medications: None       Past Medical History:   Diagnosis Date    Atrial fibrillation ([facility] [address] )     Atrial fibrillation (HCC)     Hyperlipidemia        History reviewed  No pertinent surgical history  History reviewed  No pertinent family history  I have reviewed and agree with the history as documented  Social History   Substance Use Topics    Smoking status: Former Smoker    Smokeless tobacco: Never Used    Alcohol use No        Review of Systems   Constitutional: Negative  Negative for fever  HENT: Negative  Eyes: Negative  Respiratory: Negative  Cardiovascular: Negative  Negative for chest pain  Gastrointestinal: Negative  Negative for abdominal pain and bowel incontinence  Genitourinary: Negative  Negative for bladder incontinence, dysuria and pelvic pain  Musculoskeletal: Positive for back pain  Negative for neck pain  Skin: Negative  Allergic/Immunologic: Negative  Neurological: Negative  Negative for tingling, weakness, numbness, headaches and paresthesias  Hematological: Negative  Psychiatric/Behavioral: Negative  All other systems reviewed and are negative        Physical Exam  ED Triage Vitals [11/27/17 1228]   Temperature Pulse Respirations Blood Pressure SpO2   97 9 °F (36 6 °C) 77 18 140/81 94 %      Temp Source Heart Rate Source Patient Position - Orthostatic VS BP Location FiO2 (%)   Temporal Monitor Sitting Left arm --      Pain Score       No Pain           Orthostatic Vital Signs  Vitals:    11/27/17 1228 11/27/17 1305 11/27/17 1458   BP: 140/81 138/72 135/55   Pulse: 77 71 77   Patient Position - Orthostatic VS: Sitting Sitting Lying       Physical Exam   Constitutional: He is oriented to person, place, and time  He appears well-developed and well-nourished  Non-toxic appearance  He does not have a sickly appearance  He does not appear ill  No distress  HENT:   Head: Normocephalic and atraumatic  Right Ear: Tympanic membrane and external ear normal    Left Ear: Tympanic membrane and external ear normal    Mouth/Throat: Oropharynx is clear and moist    Cold ecchymosis ( greenish-brown) under right eye  No new ecchymosis noted  Eyes: Conjunctivae and EOM are normal  Pupils are equal, round, and reactive to light  No scleral icterus  Neck: Normal range of motion  Neck supple  No spinous process tenderness present  Cardiovascular: Normal rate and normal heart sounds  An irregular rhythm present  Pulmonary/Chest: Effort normal and breath sounds normal    Abdominal: Soft  Bowel sounds are normal  He exhibits no distension and no mass  There is no tenderness  No hernia  Musculoskeletal: Normal range of motion  He exhibits no edema or deformity  Thoracic back: He exhibits tenderness, bony tenderness and pain  He exhibits normal range of motion, no swelling, no edema, no deformity and no laceration  Back:    Patient has ecchymosis to the left lower ribs with tenderness and healing abrasion  There is also ecchymosis over the midline lumbar spine with tenderness  Neurological: He is alert and oriented to person, place, and time  He has normal strength and normal reflexes  He displays normal reflexes  He exhibits normal muscle tone     Skin: Skin is warm and dry  No rash noted  He is not diaphoretic  No erythema  No pallor  Psychiatric: He has a normal mood and affect  Nursing note and vitals reviewed  ED Medications  Medications   sodium chloride 0 9 % bolus 1,000 mL (1,000 mL Intravenous New Bag 11/27/17 1330)   iodixanol (VISIPAQUE) 320 MG/ML injection 100 mL (100 mL Intravenous Given 11/27/17 1428)       Diagnostic Studies  Results Reviewed     Procedure Component Value Units Date/Time    Urine Microscopic [[id number]]  (Abnormal) Collected:  11/27/17 1416    Lab Status:  Final result Specimen:  Urine from Urine, Clean Catch Updated:  11/27/17 1423     RBC, UA None Seen /hpf      WBC, UA 4-10 (A) /hpf      Epithelial Cells Occasional /hpf      Bacteria, UA Occasional /hpf     POCT urinalysis dipstick [[id number]]  (Abnormal) Resulted:  11/27/17 1405    Lab Status:  Final result Specimen:  Urine Updated:  11/27/17 1405    ED Urine Macroscopic [[id number]]  (Abnormal) Collected:  11/27/17 1416    Lab Status:  Final result Specimen:  Urine Updated:  11/27/17 1400     Color, UA Yellow     Clarity, UA Clear     pH, UA 5 0     Leukocytes, UA Trace (A)     Nitrite, UA Negative     Protein, UA Trace (A) mg/dl      Glucose, UA Negative mg/dl      Ketones, UA Trace (A) mg/dl      Urobilinogen, UA 0 2 E U /dl      Bilirubin, UA Interference- unable to analyze (A)     Blood, UA Negative     Specific Gravity, UA 1 025    Narrative:       CLINITEK RESULT    APTT [[id number]]  (Abnormal) Collected:  11/27/17 1325    Lab Status:  Final result Specimen:  Blood from Arm, Right Updated:  11/27/17 1352     PTT 81 (H) seconds     Narrative:          Therapeutic Heparin Range = 60-90 seconds    Protime-INR [[id number]]  (Abnormal) Collected:  11/27/17 1325    Lab Status:  Final result Specimen:  Blood from Arm, Right Updated:  11/27/17 1352     Protime 32 9 (H) seconds      INR 3 16 (H)    Basic metabolic panel [[id number]] Collected:  11/27/17 1325    Lab Status:  Final result Specimen:  Blood from Arm, Right Updated:  11/27/17 1343     Sodium 141 mmol/L      Potassium 4 8 mmol/L      Chloride 103 mmol/L      CO2 31 mmol/L      Anion Gap 7 mmol/L      BUN 16 mg/dL      Creatinine 1 15 mg/dL      Glucose 110 mg/dL      Calcium 9 2 mg/dL      eGFR 54 ml/min/1 73sq m     Narrative:         National Kidney Disease Education Program recommendations are as follows:  GFR calculation is accurate only with a steady state creatinine  Chronic Kidney disease less than 60 ml/min/1 73 sq  meters  Kidney failure less than 15 ml/min/1 73 sq  meters  CBC and differential [[id number]]  (Abnormal) Collected:  11/27/17 1325    Lab Status:  Final result Specimen:  Blood from Arm, Right Updated:  11/27/17 1336     WBC 7 71 Thousand/uL      RBC 4 58 Million/uL      Hemoglobin 14 8 g/dL      Hematocrit 43 7 %      MCV 95 fL      MCH 32 3 pg      MCHC 33 9 g/dL      RDW 14 5 %      MPV 9 3 fL      Platelets 837 Thousands/uL      nRBC 0 /100 WBCs      Neutrophils Relative 54 %      Lymphocytes Relative 29 %      Monocytes Relative 9 %      Eosinophils Relative 7 (H) %      Basophils Relative 1 %      Neutrophils Absolute 4 14 Thousands/µL      Lymphocytes Absolute 2 23 Thousands/µL      Monocytes Absolute 0 72 Thousand/µL      Eosinophils Absolute 0 56 Thousand/µL      Basophils Absolute 0 06 Thousands/µL                  CT recon only lumbar spine   Final Result by Rowena Weems MD (11/27 6312)      No fracture or traumatic subluxation  Multilevel spondylitic degenerative changes of the lumbar spine as described above, especially at L4/L5 where there is a diffuse disc bulge with bilateral facet arthropathy causing moderate to severe spinal canal stenosis and moderate to severe bilateral    neural foraminal narrowing, left greater than right           Workstation performed: [id number]         CT chest abdomen pelvis w contrast   Final Result by Francisco Kaba MD (11/27 4124)      Displaced posterior lateral left 9th rib fracture  No underlying pulmonary contusion or pneumothorax  Peripheral and bibasilar pulmonary scarring keeping with chronic interstitial lung disease, possibly IPF/UIP  Cardiomegaly  Mild diffuse interstitial thickening overlying the scarring could indicate mild pulmonary edema/CHF  No signs of acute injury within the abdomen or pelvis  Workstation performed: [id number]         CT head without contrast   Final Result by Anuj Odell MD (11/27 1501)      No acute intracranial abnormality  Mild chronic small vessel ischemic changes and volume loss  Workstation performed: [id number]         CT cervical spine without contrast   Final Result by Anuj Odell MD (11/27 1500)      No cervical spine fracture or traumatic malalignment  Stable multilevel spondylitic degenerative changes of the cervical spine  Workstation performed: [id number]                    Procedures  Procedures       Phone Contacts  ED Phone Contact    ED Course  ED Course                                MDM  Number of Diagnoses or Management Options  Diagnosis management comments: 66-year-old male presents for evaluation of ongoing posterior rib pain and back pain after sustaining a fall 4 days ago  He had been repeatedly refusing to come to the emergency department and was going to his family doctor's today but it was closed and so decided to come here  He is on Coumadin he denies abdominal pain  He denies headache  No nausea or vomiting  No chest pain or shortness of breath  On exam he appears well and is in no acute distress  He does have tenderness over the posterior left lower ribs with ecchymosis hair and also in the lower lumbar spine area  Neurologically he is intact  Given his age and the fact that he is on Coumadin  Will CT head, C-spine, chest abdomen pelvis to rule out intra-abdominal or chest pathology    Will also do lumbar recons to rule out lumbar spine fracture  Amount and/or Complexity of Data Reviewed  Clinical lab tests: ordered and reviewed  Tests in the radiology section of CPT®: ordered and reviewed  Independent visualization of images, tracings, or specimens: yes      CritCare Time    Disposition  Final diagnoses:   Closed fracture of one rib of left side, initial encounter     Time reflects when diagnosis was documented in both MDM as applicable and the Disposition within this note     Time User Action Codes Description Comment    11/27/2017  3:41 PM Dana MELLO Add [S22 [id number]] Closed fracture of one rib of left side, initial encounter       ED Disposition     ED Disposition Condition Comment    Discharge  Rogers Facundo discharge to home/self care  Condition at discharge: Good        Follow-up Information     Follow up With Specialties Details Why Contact Info    Tang Sotomayor MD Family Medicine Schedule an appointment as soon as possible for a visit in 2 days If symptoms worsen Clemente Charles[address] Suyapa Nolasco          Patient's Medications   Discharge Prescriptions    HYDROCODONE-ACETAMINOPHEN (NORCO) 5-325 MG PER TABLET    Take 1 tablet by mouth every 4 (four) hours as needed for pain Max Daily Amount: 6 tablets       Start Date: 11/27/2017End Date: --       Order Dose: 1 tablet       Quantity: 10 tablet    Refills: 0     No discharge procedures on file      ED Provider  Electronically Signed by           Yudith Yap DO  11/27/17 8439

## 2017-11-28 ENCOUNTER — APPOINTMENT (INPATIENT)
Dept: RADIOLOGY | Facility: HOSPITAL | Age: 82
DRG: 092 | End: 2017-11-28
Payer: MEDICARE

## 2017-11-28 ENCOUNTER — HOSPITAL ENCOUNTER (INPATIENT)
Facility: HOSPITAL | Age: 82
LOS: 3 days | DRG: 092 | End: 2017-12-01
Attending: EMERGENCY MEDICINE | Admitting: INTERNAL MEDICINE
Payer: MEDICARE

## 2017-11-28 DIAGNOSIS — R29.6 RECURRENT FALLS: Primary | ICD-10-CM

## 2017-11-28 DIAGNOSIS — R53.1 INCREASED WEAKNESS WHEN AMBULATING: ICD-10-CM

## 2017-11-28 DIAGNOSIS — R53.83 LETHARGY: ICD-10-CM

## 2017-11-28 DIAGNOSIS — R41.89 COGNITIVE DECLINE: ICD-10-CM

## 2017-11-28 DIAGNOSIS — R41.82 MENTAL STATUS CHANGE: ICD-10-CM

## 2017-11-28 PROBLEM — S22.32XA: Status: ACTIVE | Noted: 2017-11-28

## 2017-11-28 PROBLEM — I49.5 SSS (SICK SINUS SYNDROME) (HCC): Status: ACTIVE | Noted: 2017-11-28

## 2017-11-28 PROBLEM — I48.91 ATRIAL FIBRILLATION (HCC): Status: ACTIVE | Noted: 2017-11-28

## 2017-11-28 LAB
ALBUMIN SERPL BCP-MCNC: 3.5 G/DL (ref 3.5–5)
ALP SERPL-CCNC: 105 U/L (ref 46–116)
ALT SERPL W P-5'-P-CCNC: 36 U/L (ref 12–78)
ANION GAP SERPL CALCULATED.3IONS-SCNC: 7 MMOL/L (ref 4–13)
APTT PPP: 65 SECONDS (ref 23–35)
AST SERPL W P-5'-P-CCNC: 48 U/L (ref 5–45)
BASE EX.OXY STD BLDV CALC-SCNC: 49 % (ref 60–80)
BASE EXCESS BLDV CALC-SCNC: 1.6 MMOL/L
BASOPHILS # BLD AUTO: 0.04 THOUSANDS/ΜL (ref 0–0.1)
BASOPHILS NFR BLD AUTO: 1 % (ref 0–1)
BILIRUB SERPL-MCNC: 0.72 MG/DL (ref 0.2–1)
BILIRUB UR QL STRIP: NEGATIVE
BUN SERPL-MCNC: 19 MG/DL (ref 5–25)
CALCIUM SERPL-MCNC: 9.7 MG/DL (ref 8.3–10.1)
CHLORIDE SERPL-SCNC: 100 MMOL/L (ref 100–108)
CLARITY UR: CLEAR
CO2 SERPL-SCNC: 32 MMOL/L (ref 21–32)
COLOR UR: YELLOW
COLOR, POC: YELLOW
CREAT SERPL-MCNC: 1.07 MG/DL (ref 0.6–1.3)
EOSINOPHIL # BLD AUTO: 0.55 THOUSAND/ΜL (ref 0–0.61)
EOSINOPHIL NFR BLD AUTO: 6 % (ref 0–6)
ERYTHROCYTE [DISTWIDTH] IN BLOOD BY AUTOMATED COUNT: 14.6 % (ref 11.6–15.1)
GFR SERPL CREATININE-BSD FRML MDRD: 59 ML/MIN/1.73SQ M
GLUCOSE SERPL-MCNC: 101 MG/DL (ref 65–140)
GLUCOSE UR STRIP-MCNC: NEGATIVE MG/DL
HCO3 BLDV-SCNC: 26.9 MMOL/L (ref 24–30)
HCT VFR BLD AUTO: 44.4 % (ref 36.5–49.3)
HGB BLD-MCNC: 15.3 G/DL (ref 12–17)
HGB UR QL STRIP.AUTO: NEGATIVE
INR PPP: 2.66 (ref 0.86–1.16)
KETONES UR STRIP-MCNC: NEGATIVE MG/DL
LEUKOCYTE ESTERASE UR QL STRIP: NEGATIVE
LYMPHOCYTES # BLD AUTO: 2.47 THOUSANDS/ΜL (ref 0.6–4.47)
LYMPHOCYTES NFR BLD AUTO: 28 % (ref 14–44)
MCH RBC QN AUTO: 33 PG (ref 26.8–34.3)
MCHC RBC AUTO-ENTMCNC: 34.5 G/DL (ref 31.4–37.4)
MCV RBC AUTO: 96 FL (ref 82–98)
MONOCYTES # BLD AUTO: 0.64 THOUSAND/ΜL (ref 0.17–1.22)
MONOCYTES NFR BLD AUTO: 7 % (ref 4–12)
NEUTROPHILS # BLD AUTO: 5.02 THOUSANDS/ΜL (ref 1.85–7.62)
NEUTS SEG NFR BLD AUTO: 58 % (ref 43–75)
NITRITE UR QL STRIP: NEGATIVE
NRBC BLD AUTO-RTO: 0 /100 WBCS
O2 CT BLDV-SCNC: 9.9 ML/DL
PCO2 BLDV: 44.9 MM HG (ref 42–50)
PH BLDV: 7.4 [PH] (ref 7.3–7.4)
PH UR STRIP.AUTO: 5.5 [PH] (ref 4.5–8)
PLATELET # BLD AUTO: 233 THOUSANDS/UL (ref 149–390)
PMV BLD AUTO: 9.2 FL (ref 8.9–12.7)
PO2 BLDV: 25.5 MM HG (ref 35–45)
POTASSIUM SERPL-SCNC: 4.7 MMOL/L (ref 3.5–5.3)
PROT SERPL-MCNC: 8.6 G/DL (ref 6.4–8.2)
PROT UR STRIP-MCNC: NEGATIVE MG/DL
PROTHROMBIN TIME: 28.7 SECONDS (ref 12.1–14.4)
RBC # BLD AUTO: 4.64 MILLION/UL (ref 3.88–5.62)
SODIUM SERPL-SCNC: 139 MMOL/L (ref 136–145)
SP GR UR STRIP.AUTO: 1.02 (ref 1–1.03)
UROBILINOGEN UR QL STRIP.AUTO: 0.2 E.U./DL
WBC # BLD AUTO: 8.72 THOUSAND/UL (ref 4.31–10.16)

## 2017-11-28 PROCEDURE — 81003 URINALYSIS AUTO W/O SCOPE: CPT

## 2017-11-28 PROCEDURE — 82805 BLOOD GASES W/O2 SATURATION: CPT | Performed by: EMERGENCY MEDICINE

## 2017-11-28 PROCEDURE — 80053 COMPREHEN METABOLIC PANEL: CPT | Performed by: EMERGENCY MEDICINE

## 2017-11-28 PROCEDURE — 85025 COMPLETE CBC W/AUTO DIFF WBC: CPT | Performed by: EMERGENCY MEDICINE

## 2017-11-28 PROCEDURE — 36415 COLL VENOUS BLD VENIPUNCTURE: CPT | Performed by: EMERGENCY MEDICINE

## 2017-11-28 PROCEDURE — 93005 ELECTROCARDIOGRAM TRACING: CPT

## 2017-11-28 PROCEDURE — 81002 URINALYSIS NONAUTO W/O SCOPE: CPT | Performed by: EMERGENCY MEDICINE

## 2017-11-28 PROCEDURE — 93005 ELECTROCARDIOGRAM TRACING: CPT | Performed by: EMERGENCY MEDICINE

## 2017-11-28 PROCEDURE — 85730 THROMBOPLASTIN TIME PARTIAL: CPT | Performed by: EMERGENCY MEDICINE

## 2017-11-28 PROCEDURE — 85610 PROTHROMBIN TIME: CPT | Performed by: EMERGENCY MEDICINE

## 2017-11-28 PROCEDURE — 71010 HB CHEST X-RAY 1 VIEW FRONTAL (PORTABLE): CPT

## 2017-11-28 RX ORDER — HYDROCODONE BITARTRATE AND ACETAMINOPHEN 5; 325 MG/1; MG/1
1 TABLET ORAL EVERY 4 HOURS PRN
Status: DISCONTINUED | OUTPATIENT
Start: 2017-11-28 | End: 2017-11-28

## 2017-11-28 RX ORDER — ONDANSETRON 2 MG/ML
4 INJECTION INTRAMUSCULAR; INTRAVENOUS EVERY 6 HOURS PRN
Status: DISCONTINUED | OUTPATIENT
Start: 2017-11-28 | End: 2017-12-01 | Stop reason: HOSPADM

## 2017-11-28 RX ORDER — ACETAMINOPHEN 325 MG/1
650 TABLET ORAL EVERY 6 HOURS PRN
Status: DISCONTINUED | OUTPATIENT
Start: 2017-11-28 | End: 2017-11-28

## 2017-11-28 RX ORDER — ACETAMINOPHEN 325 MG/1
650 TABLET ORAL EVERY 6 HOURS PRN
Status: DISCONTINUED | OUTPATIENT
Start: 2017-11-28 | End: 2017-12-01 | Stop reason: HOSPADM

## 2017-11-28 RX ORDER — HYDROCODONE BITARTRATE AND ACETAMINOPHEN 5; 325 MG/1; MG/1
1 TABLET ORAL EVERY 4 HOURS PRN
Status: DISCONTINUED | OUTPATIENT
Start: 2017-11-28 | End: 2017-11-29

## 2017-11-28 RX ORDER — LIDOCAINE 50 MG/G
1 PATCH TOPICAL DAILY
Status: DISCONTINUED | OUTPATIENT
Start: 2017-11-28 | End: 2017-12-01 | Stop reason: HOSPADM

## 2017-11-28 RX ORDER — WARFARIN SODIUM 5 MG/1
5 TABLET ORAL
Status: DISCONTINUED | OUTPATIENT
Start: 2017-11-29 | End: 2017-12-01 | Stop reason: HOSPADM

## 2017-11-28 RX ADMIN — EZETIMIBE: 10 TABLET ORAL at 22:24

## 2017-11-28 NOTE — ED PROVIDER NOTES
History  Chief Complaint   Patient presents with    Fatigue     Daughter reports patient has been just falling asleep more than usual- while eating for examnple  Had been on Tylenolwith Codeine for fx rib, but has not any since 0830  History provided by:  Patient and relative  History limited by:  Age   used: No    Fatigue   Severity:  Mild  Onset quality:  Gradual  Duration: few days, unable to specify exact duration  Timing:  Sporadic  Progression:  Waxing and waning  Chronicity:  Recurrent  Context comment:  Recent Falls on 11/16 and 11/23, had multiple ER evals, including Trauma scans yesterday, only showed one rib fracture, taking Tylenol/Codeine, possible sleepy from that; Daughter did not know that CT head was done yesterday, no new fall since 11/23  Relieved by:  Nothing  Worsened by:  Nothing  Ineffective treatments:  None tried  Associated symptoms: falls    Associated symptoms: no abdominal pain, no aphasia, no chest pain, no cough, no diarrhea, no difficulty walking, no dizziness, no dysuria, no fever, no foul-smelling urine, no headaches, no loss of consciousness, no nausea, no shortness of breath, no stroke symptoms and no vomiting    Associated symptoms comment:  Falling sleep during the day, possibly med related    Risk factors comment:  Afib on Coumadin      Prior to Admission Medications   Prescriptions Last Dose Informant Patient Reported? Taking?    HYDROcodone-acetaminophen (NORCO) 5-325 mg per tablet   No Yes   Sig: Take 1 tablet by mouth every 4 (four) hours as needed for pain Max Daily Amount: 6 tablets   Multiple Vitamins-Minerals (MULTIVITAMIN ADULT PO)   Yes Yes   Sig: Take by mouth daily   ezetimibe-simvastatin (VYTORIN) 10-20 mg per tablet   Yes Yes   Sig: Take by mouth   warfarin (COUMADIN) 5 mg tablet   Yes Yes   Sig: Take 5 mg by mouth daily      Facility-Administered Medications: None       Past Medical History:   Diagnosis Date    Atrial fibrillation [facility])     Atrial fibrillation ([facility] [address] )     Hyperlipidemia        History reviewed  No pertinent surgical history  History reviewed  No pertinent family history  I have reviewed and agree with the history as documented  Social History   Substance Use Topics    Smoking status: Former Smoker    Smokeless tobacco: Never Used    Alcohol use No        Review of Systems   Constitutional: Positive for fatigue  Negative for activity change, chills and fever  HENT: Negative for facial swelling, sore throat and trouble swallowing  Eyes: Negative for pain and visual disturbance  Respiratory: Negative for cough, chest tightness and shortness of breath  Cardiovascular: Negative for chest pain and leg swelling  Gastrointestinal: Negative for abdominal pain, blood in stool, diarrhea, nausea and vomiting  Genitourinary: Negative for dysuria and flank pain  Musculoskeletal: Positive for falls  Negative for back pain, neck pain and neck stiffness  Skin: Negative for pallor and rash  Allergic/Immunologic: Negative for environmental allergies and immunocompromised state  Neurological: Negative for dizziness, loss of consciousness and headaches  Hematological: Negative for adenopathy  Does not bruise/bleed easily  Psychiatric/Behavioral: Negative for agitation and behavioral problems  All other systems reviewed and are negative        Physical Exam  ED Triage Vitals   Temperature Pulse Respirations Blood Pressure SpO2   11/28/17 1455 11/28/17 1455 11/28/17 1455 11/28/17 1455 11/28/17 1455   97 8 °F (36 6 °C) 74 16 137/71 99 %      Temp Source Heart Rate Source Patient Position - Orthostatic VS BP Location FiO2 (%)   11/28/17 1455 11/28/17 1624 11/28/17 1624 11/28/17 1624 --   Temporal Monitor Sitting Left arm       Pain Score       11/28/17 1455       No Pain           Orthostatic Vital Signs  Vitals:    11/28/17 1455 11/28/17 1624   BP: 137/71 (!) 188/78   Pulse: 74 74   Patient Position - Orthostatic VS:  Sitting       Physical Exam   Constitutional: He is oriented to person, place, and time  He appears well-developed and well-nourished  No distress  HENT:   Head: Normocephalic and atraumatic  Eyes: EOM are normal    Neck: Normal range of motion  Neck supple  Cardiovascular: Normal rate, regular rhythm, normal heart sounds and intact distal pulses  Pulmonary/Chest: Effort normal and breath sounds normal    Abdominal: Soft  Bowel sounds are normal  There is no tenderness  There is no rebound and no guarding  Musculoskeletal: Normal range of motion  Neurological: He is alert and oriented to person, place, and time  No cranial nerve deficit  Coordination normal    Skin: Skin is warm and dry  Psychiatric: He has a normal mood and affect  Nursing note and vitals reviewed        ED Medications  Medications - No data to display    Diagnostic Studies  Results Reviewed     Procedure Component Value Units Date/Time    POCT urinalysis dipstick [[id number]]  (Normal) Resulted:  11/28/17 1825    Lab Status:  Final result Specimen:  Urine Updated:  11/28/17 1825     Color, UA yellow    ED Urine Macroscopic [[id number]]  (Normal) Collected:  11/28/17 1836    Lab Status:  Final result Specimen:  Urine Updated:  11/28/17 1820     Color, UA Yellow     Clarity, UA Clear     pH, UA 5 5     Leukocytes, UA Negative     Nitrite, UA Negative     Protein, UA Negative mg/dl      Glucose, UA Negative mg/dl      Ketones, UA Negative mg/dl      Urobilinogen, UA 0 2 E U /dl      Bilirubin, UA Negative     Blood, UA Negative     Specific Gravity, UA 1 020    Narrative:       CLINITEK RESULT    Protime-INR [[id number]]  (Abnormal) Collected:  11/28/17 1708    Lab Status:  Final result Specimen:  Blood from Arm, Right Updated:  11/28/17 1742     Protime 28 7 (H) seconds      INR 2 66 (H)    APTT [[id number]]  (Abnormal) Collected:  11/28/17 1708    Lab Status:  Final result Specimen:  Blood from Arm, Right Updated:  11/28/17 1742 PTT 65 (H) seconds     Narrative: Therapeutic Heparin Range = 60-90 seconds    Comprehensive metabolic panel [[id number]]  (Abnormal) Collected:  11/28/17 1708    Lab Status:  Final result Specimen:  Blood from Arm, Right Updated:  11/28/17 1732     Sodium 139 mmol/L      Potassium 4 7 mmol/L      Chloride 100 mmol/L      CO2 32 mmol/L      Anion Gap 7 mmol/L      BUN 19 mg/dL      Creatinine 1 07 mg/dL      Glucose 101 mg/dL      Calcium 9 7 mg/dL      AST 48 (H) U/L      ALT 36 U/L      Alkaline Phosphatase 105 U/L      Total Protein 8 6 (H) g/dL      Albumin 3 5 g/dL      Total Bilirubin 0 72 mg/dL      eGFR 59 ml/min/1 73sq m     Narrative:         National Kidney Disease Education Program recommendations are as follows:  GFR calculation is accurate only with a steady state creatinine  Chronic Kidney disease less than 60 ml/min/1 73 sq  meters  Kidney failure less than 15 ml/min/1 73 sq  meters      Blood gas, venous [[id number]]  (Abnormal) Collected:  11/28/17 1721    Lab Status:  Final result Specimen:  Blood from Arm, Left Updated:  11/28/17 1730     pH, Leo 7 396     pCO2, Leo 44 9 mm Hg      pO2, Leo 25 5 (L) mm Hg      HCO3, Leo 26 9 mmol/L      Base Excess, Leo 1 6 mmol/L      O2 Content, Leo 9 9 ml/dL      O2 HGB, VENOUS 49 0 (L) %     CBC and differential [[id number]]  (Normal) Collected:  11/28/17 1708    Lab Status:  Final result Specimen:  Blood from Arm, Right Updated:  11/28/17 1720     WBC 8 72 Thousand/uL      RBC 4 64 Million/uL      Hemoglobin 15 3 g/dL      Hematocrit 44 4 %      MCV 96 fL      MCH 33 0 pg      MCHC 34 5 g/dL      RDW 14 6 %      MPV 9 2 fL      Platelets 568 Thousands/uL      nRBC 0 /100 WBCs      Neutrophils Relative 58 %      Lymphocytes Relative 28 %      Monocytes Relative 7 %      Eosinophils Relative 6 %      Basophils Relative 1 %      Neutrophils Absolute 5 02 Thousands/µL      Lymphocytes Absolute 2 47 Thousands/µL      Monocytes Absolute 0 64 Thousand/µL Eosinophils Absolute 0 55 Thousand/µL      Basophils Absolute 0 04 Thousands/µL                  No orders to display              Procedures  ECG 12 Lead Documentation  Date/Time: 11/28/2017 6:56 PM  Performed by: Erik Thornton by: Farhat Monet     Indications / Diagnosis:  Altered mental status  ECG reviewed by me, the ED Provider: yes    Patient location:  ED  Interpretation:     Interpretation: normal    Rate:     ECG rate:  77    ECG rate assessment: normal    Rhythm:     Rhythm: sinus rhythm    Ectopy:     Ectopy: none    QRS:     QRS axis:  Normal  Conduction:     Conduction: normal    ST segments:     ST segments:  Normal  T waves:     T waves: normal             Phone Contacts  ED Phone Contact    ED Course  ED Course as of Nov 28 1858   Tue Nov 28, 2017   [address] [facility] Labs reviewed, no acute abnormality, blood pressure 188/78, case discussed with Medicine, will admit as inpatient for hypertensive urgency, recurrent falls, ambulatory dysfunction, asthenia  MDM  Number of Diagnoses or Management Options  Increased weakness when ambulating: new and requires workup  Mental status change: new and requires workup  Recurrent falls: new and requires workup  Diagnosis management comments: Patient is a 80-year-old male, lives at [address] [place] [address]; comes in with history of recurrent falls, recent falls on 11/16 & 11/23; on Coumadin for atrial fibrillation, was seen here yesterday had CTs and medical workup CT only showed single rib fracture on left side, no intracranial bleed; Daughter states that patient has been having episodes of drowsiness during which he would just suddenly fall asleep during daytime hours, does have history of sleep apnea, unclear compliance with CPAP  Daughter was not sure if CT head was done yesterday and was worried about delayed bleed from previous fall on 11/23; I informed that a CT head was done that did not show any acute intracranial abnormality  Patient is alert, oriented, well-appearing, hemodynamically stable vital signs noted; Lungs clear to auscultation bilaterally; Cardiovascular normal heart sounds:  Normal, equal pulses bilaterally; Abdomen: soft, nontender, nondistended, bowel sounds present; Neuro: normal cranial nerve, motor and sensory exam, no focal deficits; no neck stiffness; Extremities: no calf swelling or tenderness, neurovascular intact distally  Impression:  Recurrent falls, ambulatory dysfunction, mental status change, resolved, possibly related to sleep apnea, increased weakness with ambulation, just had medical and trauma workup with labs and CTs yesterday which will not show any acute abnormality other than single rib fracture, possibly medication related as patient is taking Tylenol/Codeine  We will check labs, CTs done yesterday, no indication for repeat imaging as no new fall; will need admission, PT eval,  eventual placement for NH/Rehab         Amount and/or Complexity of Data Reviewed  Clinical lab tests: reviewed and ordered  Tests in the medicine section of CPT®: ordered and reviewed  Review and summarize past medical records: yes  Discuss the patient with other providers: yes  Independent visualization of images, tracings, or specimens: yes      CritCare Time    Disposition  Final diagnoses:   Recurrent falls   Increased weakness when ambulating   Mental status change - resolved     Time reflects when diagnosis was documented in both MDM as applicable and the Disposition within this note     Time User Action Codes Description Comment    11/28/2017  4:55 PM Angela Hurley Add [R29 6] Recurrent falls     11/28/2017  4:56 PM Angela Hurley Add [R53 1] Increased weakness when ambulating     11/28/2017  4:57 PM Angela Hurley Add [R41 82] Mental status change     11/28/2017  4:57 PM [place], [address] [place], [place] [address] [R41 82] Mental status change resolved      ED Disposition     None      Follow-up Information    None       Patient's Medications Discharge Prescriptions    No medications on file     No discharge procedures on file      ED Provider  Electronically Signed by           Rajan Ricketts MD  11/28/17 0116

## 2017-11-28 NOTE — ED NOTES
Had head CT 11/17 after a fall  Seen here yesterday for back pain which turned out to be fractured rib       Johnson Amaya RN  11/28/17 2737

## 2017-11-29 LAB
ALBUMIN SERPL BCP-MCNC: 2.6 G/DL (ref 3.5–5)
ALP SERPL-CCNC: 84 U/L (ref 46–116)
ALT SERPL W P-5'-P-CCNC: 27 U/L (ref 12–78)
ANION GAP SERPL CALCULATED.3IONS-SCNC: 7 MMOL/L (ref 4–13)
AST SERPL W P-5'-P-CCNC: 56 U/L (ref 5–45)
ATRIAL RATE: 88 BPM
BASE EX.OXY STD BLDV CALC-SCNC: 85 % (ref 60–80)
BASE EXCESS BLDV CALC-SCNC: 2.1 MMOL/L
BILIRUB SERPL-MCNC: 0.97 MG/DL (ref 0.2–1)
BUN SERPL-MCNC: 18 MG/DL (ref 5–25)
CALCIUM SERPL-MCNC: 8.5 MG/DL (ref 8.3–10.1)
CHLORIDE SERPL-SCNC: 104 MMOL/L (ref 100–108)
CO2 SERPL-SCNC: 26 MMOL/L (ref 21–32)
CREAT SERPL-MCNC: 0.83 MG/DL (ref 0.6–1.3)
GFR SERPL CREATININE-BSD FRML MDRD: 75 ML/MIN/1.73SQ M
GLUCOSE SERPL-MCNC: 102 MG/DL (ref 65–140)
HCO3 BLDV-SCNC: 25.2 MMOL/L (ref 24–30)
INR PPP: 2.87 (ref 0.86–1.16)
O2 CT BLDV-SCNC: 16.7 ML/DL
PCO2 BLDV: 34.4 MM HG (ref 42–50)
PH BLDV: 7.48 [PH] (ref 7.3–7.4)
PO2 BLDV: 47.4 MM HG (ref 35–45)
POTASSIUM SERPL-SCNC: 4.4 MMOL/L (ref 3.5–5.3)
PROT SERPL-MCNC: 6.9 G/DL (ref 6.4–8.2)
PROTHROMBIN TIME: 30.5 SECONDS (ref 12.1–14.4)
QRS AXIS: 17 DEGREES
QRSD INTERVAL: 76 MS
QT INTERVAL: 382 MS
QTC INTERVAL: 432 MS
SODIUM SERPL-SCNC: 137 MMOL/L (ref 136–145)
T WAVE AXIS: 61 DEGREES
TSH SERPL DL<=0.05 MIU/L-ACNC: 1.38 UIU/ML (ref 0.36–3.74)
VENTRICULAR RATE: 77 BPM

## 2017-11-29 PROCEDURE — 82805 BLOOD GASES W/O2 SATURATION: CPT | Performed by: PHYSICIAN ASSISTANT

## 2017-11-29 PROCEDURE — 85610 PROTHROMBIN TIME: CPT | Performed by: PHYSICIAN ASSISTANT

## 2017-11-29 PROCEDURE — 99285 EMERGENCY DEPT VISIT HI MDM: CPT

## 2017-11-29 PROCEDURE — 36415 COLL VENOUS BLD VENIPUNCTURE: CPT | Performed by: PHYSICIAN ASSISTANT

## 2017-11-29 PROCEDURE — 80053 COMPREHEN METABOLIC PANEL: CPT | Performed by: PHYSICIAN ASSISTANT

## 2017-11-29 PROCEDURE — 84443 ASSAY THYROID STIM HORMONE: CPT | Performed by: PHYSICIAN ASSISTANT

## 2017-11-29 RX ORDER — TRAMADOL HYDROCHLORIDE 50 MG/1
25 TABLET ORAL EVERY 6 HOURS PRN
Status: DISCONTINUED | OUTPATIENT
Start: 2017-11-29 | End: 2017-12-01 | Stop reason: HOSPADM

## 2017-11-29 RX ADMIN — WARFARIN SODIUM 5 MG: 5 TABLET ORAL at 18:30

## 2017-11-29 RX ADMIN — EZETIMIBE: 10 TABLET ORAL at 21:23

## 2017-11-29 RX ADMIN — LIDOCAINE 1 PATCH: 50 PATCH CUTANEOUS at 08:39

## 2017-11-29 NOTE — ED NOTES
Assumed care of pt at this time, pt resting in bed  SCD pumps on and functioning            Charla Woodson RN  11/29/17 0188

## 2017-11-29 NOTE — PLAN OF CARE
DISCHARGE PLANNING     Discharge to home or other facility with appropriate resources Progressing        PAIN - ADULT     Verbalizes/displays adequate comfort level or baseline comfort level Progressing        Potential for Falls     Patient will remain free of falls Progressing        Prexisting or High Potential for Compromised Skin Integrity     Skin integrity is maintained or improved Progressing        SAFETY ADULT     Maintain or return to baseline ADL function Progressing     Maintain or return mobility status to optimal level Progressing

## 2017-11-29 NOTE — H&P
History and Physical - [facility]    Patient Information: Cruzita Dakin 80 y o  male MRN: [id number]  Unit/Bed#: ED 25 Encounter: [id number]  Admitting Physician: Eloy Jordan PA-C  PCP: Palmer Tariq MD  Date of Admission:  11/28/17    Assessment/Plan:    Hospital Problem List:     Principal Problem:    Lethargic  Active Problems:    Atrial fibrillation (HCC)    SSS (sick sinus syndrome) ([facility] [address] )    Closed traumatic displaced fracture of one rib of left side    Frequent falls    Cognitive decline      Plan for the Primary Problem(s):  · Lethargy  · In the setting of uncontrolled DYAN, circadian rhythm disorder,narcotics from recent fall and displaced rib fracture, in elderly pt  · Lab work unremarkable except as noted below, pt alert and oriented to hospital setting, month (w/prompting of recent holiday), oriented to year and president  When asked why he is here he says 'I fractured my ribs'  · No focal neurodeficits, however given recurrent falls, pt's family concerned for safety, and is concerned pt isn't safe at home alone  · Will admit to IP, consult CM, PT, OT  · Avoid narcotics if possible, encourage appropriate sleep cycle, encourage CPAP, consult geriatrics for further recommendations given concommittant cognitive decline    Plan for Additional Problems:   · A fib/SSS   Rate controlled off meds   Pt on coumadin INR therpeutic at 2 66   Given pt w/o PPM and frequent fallswill consult cardiolgy as pt known to dr Fabby Boswell,   Will monitor on telemetry for arrhythmias     Displaced left rib fracture   From prior falls, previously diagnosed   CXR pending official read, however no evidence of PTX   Supportive care, provide pain control    Frequent falls   Pt denies any pain although reports he occasionally gets pain in left posterolateral mid back at level of rib fx   No headache, neck pain, worsening back pain, numbness/tingling or unilateral weakness    ? IPF   Pt w/honey combing on recent CT CAP   No respiratory distress, good airway movement, pt 95% RA and not somnolent   VBG demonstrates pH 7 39, pCO2 44 9, however pO2 of 25 5 and O2 hgb of 49 0  Suspect this is a false reading given clinical appropriateness however will check CXR, will start NC to see if any improvement in lethargy, repeat VBG in AM, start continuous pulse ox   Recommend f/u with pulmonlogy upon d/c    HLD   Continue OP Vytorin    Cognitive decline   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 given mild microangiopathic changes on CT head   Will f/u with geriatric consult for establishment and for med recommendations given concommittant lethargy    VTE Prophylaxis: Warfarin (Coumadin)  / sequential compression device   Code Status: DNI/DNR  POLST: There is no POLST form on file for this patient (pre-hospital)    Anticipated Length of Stay:  Patient will be admitted on an Inpatient basis with an anticipated length of stay of  Greater than 2 midnights  Justification for Hospital Stay: lethargy and frequent falls    Total Time for Visit, including Counseling / Coordination of Care: 45 minutes  Greater than 50% of this total time spent on direct patient counseling and coordination of care  Chief Complaint:   Frequent falls, worsening lethargy    History of Present Illness:    Jose Martin Garvin is a 80 y o  male who presents with PMH of DYAN, AFib, sick sinus syndrome, hyperlipidemia  coming in the ED after recent visit for a fall that happened on 11/23/2017  Patient was seen and examined yesterday in the ER after a fall, and was found to have a displaced left 9th rib fracture    He also had a CT pan scan, his head was negative for acute hemorrhage ischemia or midline shift, cervical spinal is a negative for any malalignment or fracture, his chest abdomen pelvis with contrast demonstrated a the 9th rib fracture, however no acute intra-abdominal process, did do out honeycombing in the lungs and questionable interstitial thickening/edema, and lumbar spine demonstrated degenerative diseases but note no acute fracture, malalignment or subluxation  He was discharged home with low-dose Norco, and was still lethargic today which prompted his family to bring him in for evaluation  Currently patient's son is at bedside, however per report his daughter was also there previously was concerned that his lethargy still had improved and that he was a fall risk and was unsafe to live at home alone  Per the patient's son, the patient has been having lethargy for the last 2-3 weeks, but is the been worse over the last several days due to the falls  He does have sleep apnea which she is noncompliant for his CPAP mask  He also is taking frequent daytime naps  The patient denies any chest pain, shortness of breath lightheadedness or dizziness  Has no nausea vomiting fevers or chills or abdominal pain or diarrhea  No neck pain, no headache blurry vision or numbness or tingling or weakness in arms or legs  He does note that he has some pain in his left lateral mid back  Review of Systems:    Review of Systems   Constitutional: Negative for appetite change, chills and fever  HENT: Negative for congestion, sinus pressure and sore throat  Eyes: Negative for photophobia and visual disturbance  Respiratory: Positive for apnea and cough  Negative for shortness of breath  Cardiovascular: Negative for chest pain, palpitations and leg swelling  Gastrointestinal: Negative for abdominal pain, diarrhea, nausea and vomiting  Musculoskeletal: Positive for back pain  Neurological: Negative for syncope, light-headedness and headaches  Psychiatric/Behavioral: Positive for sleep disturbance  All other systems reviewed and are negative  Past Medical and Surgical History:     Past Medical History:   Diagnosis Date    Atrial fibrillation ([place] [address] )     Atrial fibrillation (HCC)     Hyperlipidemia        History reviewed   No pertinent surgical history  Meds/Allergies:    Prior to Admission medications    Medication Sig Start Date End Date Taking? Authorizing Provider   ezetimibe-simvastatin (VYTORIN) 10-20 mg per tablet Take by mouth   Yes Historical Provider, MD   HYDROcodone-acetaminophen (NORCO) 5-325 mg per tablet Take 1 tablet by mouth every 4 (four) hours as needed for pain Max Daily Amount: 6 tablets 11/27/17  Yes Corena Persons, DO   Multiple Vitamins-Minerals (MULTIVITAMIN ADULT PO) Take by mouth daily   Yes Historical Provider, MD   warfarin (COUMADIN) 5 mg tablet Take 5 mg by mouth daily 2/13/17  Yes Historical Provider, MD     I have reviewed home medications with patient family member  Allergies: No Known Allergies    Social History:     Marital Status: /Civil Union   Occupation:   Patient Pre-hospital Living Situation:   Patient Pre-hospital Level of Mobility:   Patient Pre-hospital Diet Restrictions:   Substance Use History:   History   Alcohol Use No     History   Smoking Status    Former Smoker   Smokeless Tobacco    Never Used     History   Drug Use No       Family History:    History reviewed  No pertinent family history  Physical Exam:     Vitals:   Blood Pressure: 151/71 (11/28/17 2057)  Pulse: 78 (11/28/17 2057)  Temperature: 97 8 °F (36 6 °C) (11/28/17 1455)  Temp Source: Temporal (11/28/17 1455)  Respirations: 18 (11/28/17 2057)  Weight - Scale: 83 9 kg (185 lb) (11/28/17 1455)  SpO2: 95 % (11/28/17 2057)    Physical Exam   Constitutional: He is oriented to person, place, and time  He appears well-developed  No distress  HENT:   Head: Normocephalic and atraumatic  Right Ear: External ear normal    Left Ear: External ear normal    Eyes: Conjunctivae are normal  Right eye exhibits no discharge  Left eye exhibits no discharge  No scleral icterus  Neck: Normal range of motion  Cardiovascular: Normal rate and normal heart sounds  Exam reveals no gallop and no friction rub  No murmur heard    irregulary irregular   Pulmonary/Chest: No respiratory distress  He has no wheezes  He has rales (mild bibasilar rales)  Abdominal: Soft  He exhibits no distension  There is no tenderness  There is no rebound and no guarding  Musculoskeletal: He exhibits no edema  Neurological: He is alert and oriented to person, place, and time  No cranial nerve deficit  Skin: Skin is warm and dry  He is not diaphoretic  Psychiatric: He has a normal mood and affect  Vitals reviewed  (  Be Sure to Include Physical Exam: Delete this entire line when you have entered your exam)    Additional Data:     Lab Results: I have personally reviewed pertinent reports  Results from last 7 days  Lab Units 11/28/17  1708   WBC Thousand/uL 8 72   HEMOGLOBIN g/dL 15 3   HEMATOCRIT % 44 4   PLATELETS Thousands/uL 233   NEUTROS PCT % 58   LYMPHS PCT % 28   MONOS PCT % 7   EOS PCT % 6       Results from last 7 days  Lab Units 11/28/17  1708   SODIUM mmol/L 139   POTASSIUM mmol/L 4 7   CHLORIDE mmol/L 100   CO2 mmol/L 32   BUN mg/dL 19   CREATININE mg/dL 1 07   CALCIUM mg/dL 9 7   TOTAL PROTEIN g/dL 8 6*   BILIRUBIN TOTAL mg/dL 0 72   ALK PHOS U/L 105   ALT U/L 36   AST U/L 48*   GLUCOSE RANDOM mg/dL 101       Results from last 7 days  Lab Units 11/28/17  1708   INR  2 66*       Imaging: I have personally reviewed pertinent reports  Ct Head Without Contrast    Result Date: 11/27/2017  Narrative: CT BRAIN - WITHOUT CONTRAST INDICATION:  Status post fall  COMPARISON:  CT brain dated November 16, 2017  TECHNIQUE:  CT examination of the brain was performed  In addition to axial images, coronal reformatted images were created and submitted for interpretation  Radiation dose length product (DLP) for this visit:  1048 mGy-cm     This examination, like all CT scans performed in the [facility], was performed utilizing techniques to minimize radiation dose exposure, including the use of iterative reconstruction and automated exposure control  IMAGE QUALITY:  Diagnostic  FINDINGS:  PARENCHYMA:  No intracranial mass, mass effect or midline shift  No CT signs of acute infarction  There is no parenchymal hemorrhage  There is mild periventricular white matter low attenuation which is nonspecific and most likely related to chronic small vessel ischemic changes  VENTRICLES AND EXTRA-AXIAL SPACES:  There is prominence of the ventricles and sulci related to mild volume loss  VISUALIZED ORBITS AND PARANASAL SINUSES:  There is a stable right-sided scleral band  CALVARIUM AND EXTRACRANIAL SOFT TISSUES:   Normal      Impression: No acute intracranial abnormality  Mild chronic small vessel ischemic changes and volume loss  Workstation performed: [id number]     Ct Head Without Contrast    Result Date: 11/16/2017  Narrative: CT BRAIN - WITHOUT CONTRAST INDICATION: Fall on blood thinners  COMPARISON:    5/31/2016 TECHNIQUE:  Multiple contiguous axial CT images were obtained at 2 5 mm intervals through the posterior fossa followed by 5 mm intervals through the remainder of the brain  This examination, like all CT scans performed in the [facility],  was performed utilizing techniques to minimize radiation dose exposure, including the use of iterative reconstruction and automated exposure control  Rad dosage 1069 mGy-cm IMAGE QUALITY:  Diagnostic FINDINGS:  PARENCHYMA: Mild age-appropriate atrophy  Stable periventricular white matter hypodensity suggestive of chronic microvascular ischemic disease  No mass, mass effect or midline shift  No hemorrhage  No signs of acute ischemia  VENTRICLES AND EXTRA-AXIAL SPACES:    Mildly enlarged, consistent with the degree of atrophy  VISUALIZED ORBITS AND PARANASAL SINUSES: Right-sided scleral band  Sinuses remain clear  CALVARIUM AND EXTRACRANIAL SOFT TISSUES:  Normal      Impression: 1  No acute intracranial pathology    Workstation performed: [id number]     Ct Cervical Spine Without Contrast    Result Date: 11/27/2017  Narrative: CT CERVICAL SPINE - WITHOUT CONTRAST INDICATION: Status post fall  COMPARISON: CT cervical spine dated November 16, 2017  TECHNIQUE:  CT examination of the cervical spine was performed without intravenous contrast   Contiguous axial images were obtained  Sagittal and coronal reconstructions were performed  Radiation dose length product (DLP) for this visit:  491 mGy-cm   This examination, like all CT scans performed in the [facility], was performed utilizing techniques to minimize radiation dose exposure, including the use of iterative reconstruction and automated exposure control  IMAGE QUALITY:  Diagnostic  FINDINGS: ALIGNMENT:  Normal alignment of the cervical spine  No subluxation  VERTEBRAL BODIES:  No fracture  DEGENERATIVE CHANGES:  Moderate multilevel cervical degenerative changes are noted  No critical central canal stenosis  Again noted is fusion of the right C3 to/C3 facets  PREVERTEBRAL AND PARASPINAL SOFT TISSUES: Normal         THORACIC INLET:  Emphysematous changes are noted at both lung apices  There is biapical parenchymal calcifications and scarring  Impression: No cervical spine fracture or traumatic malalignment  Stable multilevel spondylitic degenerative changes of the cervical spine  Workstation performed: [id number]     Ct Cervical Spine Without Contrast    Result Date: 11/16/2017  Narrative: CT CERVICAL SPINE - WITHOUT CONTRAST INDICATION:    COMPARISON: None  TECHNIQUE:  CT examination of the cervical spine was performed without intravenous contrast   Contiguous axial images were obtained  Sagittal and coronal reconstructions were performed  Radiation dose length product (DLP) for this visit:  456 mGy-cm     This examination, like all CT scans performed in the [facility], was performed utilizing techniques to minimize radiation dose exposure, including the use of iterative reconstruction and automated exposure control  IMAGE QUALITY:  Diagnostic  FINDINGS: ALIGNMENT:  Normal alignment of the cervical spine  No subluxation  VERTEBRAL BODIES:  No fracture  DEGENERATIVE CHANGES:  Moderate multilevel cervical degenerative changes are noted  No critical central canal stenosis  PREVERTEBRAL AND PARASPINAL SOFT TISSUES: Normal         THORACIC INLET:  Stable biapical calcified scarring  Impression: No cervical spine fracture or traumatic malalignment  Workstation performed: [id number]     Ct Chest Abdomen Pelvis W Contrast    Result Date: 11/27/2017  Narrative: CT CHEST, ABDOMEN AND PELVIS WITH IV CONTRAST INDICATION: "went from seated position, chair leg broke, fell onto carpeted surface on 11/23/17 and pt remained on floor for 13 hrs until someone found him, left sided back/rib pain, bruise on left flank""80year-old male with a history of atrial fibrillation on Coumadin and hyperlipidemia presents from an assisted living facility for evaluation of back and rib pain after a fall on November 23rd  Patient states he was sitting on a pablo chair and was standing to get up when the leg of the chair  broke causing him to fall directly onto his buttocks and back" COMPARISON: None  TECHNIQUE: CT examination of the chest, abdomen and pelvis was performed  CT examination of the chest, abdomen and pelvis was performed  Axial, sagittal and coronal reformatted projections were created  This examination, like all CT scans performed in  the [facility], was performed utilizing techniques to minimize radiation dose exposure, including the use of iterative reconstruction and automated exposure control  IV Contrast:  100 ML Visipaque 320  Note: (SINGLE DOSE/MULTI DOSE) information refers to the container from which the contrast was acquired  Contrast was injected one time intravenously without immediate complication  Enteric Contrast:  Enteric contrast was not administered   CHEST LUNGS: Extensive peripheral and bibasilar pulmonary scarring with associated bibasilar honeycombing  Mild overlying interstitial thickening could indicate mild overlying pulmonary edema  No endotracheal or endobronchial lesion  PLEURA:  Unremarkable  HEART/GREAT VESSELS: Unremarkable for patient's age  No CT signs of aortic injury  MEDIASTINUM AND IZABEL:  Unremarkable  CHEST WALL AND LOWER NECK:  Unremarkable  ABDOMEN LIVER/BILIARY TREE:  Unremarkable  GALLBLADDER:  No calcified gallstones  No pericholecystic inflammatory change  SPLEEN:  Unremarkable  PANCREAS:  Unremarkable  ADRENAL GLANDS:  Unremarkable  KIDNEYS/URETERS:  Unremarkable  No hydronephrosis  STOMACH AND BOWEL:  Distal colonic diverticulosis  No acute diverticulitis  No signs of bowel wall injury  APPENDIX:  No findings to suggest appendicitis  ABDOMINOPELVIC CAVITY:  No ascites or free intraperitoneal air  No lymphadenopathy  VESSELS:  Unremarkable for patient's age  PELVIS REPRODUCTIVE ORGANS:  Unremarkable for patient's age  URINARY BLADDER:  Unremarkable  ABDOMINAL WALL/INGUINAL REGIONS:  Unremarkable  OSSEOUS STRUCTURES:  Displaced fracture of the posterior lateral aspect of the left 9th rib  Impression: Displaced posterior lateral left 9th rib fracture  No underlying pulmonary contusion or pneumothorax  Peripheral and bibasilar pulmonary scarring keeping with chronic interstitial lung disease, possibly IPF/UIP  Cardiomegaly  Mild diffuse interstitial thickening overlying the scarring could indicate mild pulmonary edema/CHF  No signs of acute injury within the abdomen or pelvis  Workstation performed: [id number]     Ct Recon Only Lumbar Spine    Result Date: 11/27/2017  Narrative: CT LUMBAR SPINE INDICATION:  Status post fall  COMPARISON:  None  TECHNIQUE: Axial CT examination of the lumbar spine was obtained utilizing reconstructed images from CT of the chest, abdomen and pelvis performed the same day    Images were reformatted in the sagittal and coronal planes  This examination, like all CT scans performed in the [facility], was performed utilizing techniques to minimize radiation dose exposure, including the use of iterative reconstruction and automated exposure control  FINDINGS: ALIGNMENT: There is mild to moderate lumbar levoscoliosis with the apex at L3  No subluxation  VERTEBRAL BODIES: No fracture  No acute osseous abnormality  There are Schmorl's nodes at the L2-L5 vertebral bodies  DEGENERATIVE CHANGES: There is intervertebral disc space narrowing at L2/L3 and L4/L5  L1/L2, there is no disc bulge, spinal canal stenosis or neural foraminal narrowing  At L2/L3, there is a diffuse disc bulge with mild bilateral facet arthropathy causing minimal to mild spinal canal stenosis and mild bilateral neural foraminal narrowing  At L3/L4, there is a diffuse disc bulge with bilateral facet arthropathy and ligamentum flavum hypertrophy causing moderate spinal canal stenosis and mild bilateral neural foraminal narrowing, right greater than left  At L4/L5, there is a diffuse disc bulge with moderate bilateral facet arthropathy and ligamentum flavum hypertrophy causing moderate to severe spinal canal stenosis and severe left neural foraminal narrowing  Moderate to severe right neural foraminal narrowing is also present at this level  At L5/S1, there is a diffuse disc bulge causing no significant spinal canal stenosis and mild bilateral neural foraminal narrowing  PREVERTEBRAL AND PARASPINAL SOFT TISSUES: Normal   No hematoma  Impression: No fracture or traumatic subluxation  Multilevel spondylitic degenerative changes of the lumbar spine as described above, especially at L4/L5 where there is a diffuse disc bulge with bilateral facet arthropathy causing moderate to severe spinal canal stenosis and moderate to severe bilateral  neural foraminal narrowing, left greater than right   Workstation performed: [id number]       EKG, Pathology, and Other Studies Reviewed on Admission:   · EKG:     Allscripts / Epic Records Reviewed: No     ** Please Note: This note has been constructed using a voice recognition system   **

## 2017-11-29 NOTE — ED NOTES
Pt assisted to reposition onto right side pillow placed behind back     Sloan Aguilar RN  11/29/17 1017

## 2017-11-29 NOTE — PROGRESS NOTES
Progress Note - Michaela Babcock 80 y o  male MRN: [id number]    Unit/Bed#: ED 25 Encounter: [id number]      Subjective: The patient feels somewhat better than at the time of his arrival in the emergency room  He is more alert and coherent  He does have some chest pain but this is not severe  He denies shortness of breath, cough, or wheezing  He did get some sleep last night  Physical Exam:   Temp:  [98 1 °F (36 7 °C)-98 9 °F (37 2 °C)] 98 8 °F (37 1 °C)  HR:  [55-88] 88  Resp:  [16-19] 19  BP: (120-178)/(55-92) 120/60    Gen:  Well-developed, well-nourished, in no distress  Neck:  Supple  No lymphadenopathy, goiter, or bruit  Heart:  Irregular rhythm  I heard no murmur, gallop, or rub  Lungs:  Clear to auscultation and percussion  No wheezing, rales, or rhonchi  Breath sounds are somewhat diminished  Abd:  Soft with active bowel sounds  No mass, tenderness, or organomegaly  Extremities:  No clubbing, cyanosis, or edema  No calf tenderness  Neuro:  Alert and now oriented    No focal sign  Skin:  Warm and dry      LABS:   CBC:   Lab Results   Component Value Date    WBC 8 72 11/28/2017    HGB 15 3 11/28/2017    HCT 44 4 11/28/2017    MCV 96 11/28/2017     11/28/2017    MCH 33 0 11/28/2017    MCHC 34 5 11/28/2017    RDW 14 6 11/28/2017    MPV 9 2 11/28/2017    NRBC 0 11/28/2017   , CMP:   Lab Results   Component Value Date     11/29/2017    K 4 4 11/29/2017     11/29/2017    CO2 26 11/29/2017    ANIONGAP 7 11/29/2017    BUN 18 11/29/2017    CREATININE 0 83 11/29/2017    GLUCOSE 102 11/29/2017    CALCIUM 8 5 11/29/2017    AST 56 (H) 11/29/2017    ALT 27 11/29/2017    ALKPHOS 84 11/29/2017    PROT 6 9 11/29/2017    ALBUMIN 2 6 (L) 11/29/2017    BILITOT 0 97 11/29/2017    EGFR 75 11/29/2017           Patient Active Problem List   Diagnosis    Lethargic    Atrial fibrillation (HCC)    SSS [facility]sick sinus syndrome) ([facility] [address] )    Closed traumatic displaced fracture of one rib of left side    Frequent falls    Cognitive decline       Assessment/Plan:  1  Toxic encephalopathy likely related to hydrocodone  2  Recent fall with fractured left rib  3  Atrial fibrillation  4  History of cognitive decline  5  Gait dysfunction    The patient's mental status has improved somewhat  He will be treated with CT manifest in low dose of tramadol if needed for pain  Careful attention to pulmonary toilet will be needed since the patient will have splinting related to his fractured rib  Physical and occupational therapy evaluation have been requested  He may need short-term rehab before he is able to safely return home      VTE Pharmacologic Prophylaxis: Warfarin (Coumadin)  VTE Mechanical Prophylaxis: sequential compression device

## 2017-11-30 PROCEDURE — G8987 SELF CARE CURRENT STATUS: HCPCS

## 2017-11-30 PROCEDURE — G8978 MOBILITY CURRENT STATUS: HCPCS

## 2017-11-30 PROCEDURE — 97163 PT EVAL HIGH COMPLEX 45 MIN: CPT

## 2017-11-30 PROCEDURE — G8988 SELF CARE GOAL STATUS: HCPCS

## 2017-11-30 PROCEDURE — G8979 MOBILITY GOAL STATUS: HCPCS

## 2017-11-30 PROCEDURE — 97166 OT EVAL MOD COMPLEX 45 MIN: CPT

## 2017-11-30 RX ADMIN — EZETIMIBE: 10 TABLET ORAL at 21:40

## 2017-11-30 RX ADMIN — LIDOCAINE 1 PATCH: 50 PATCH CUTANEOUS at 09:58

## 2017-11-30 RX ADMIN — WARFARIN SODIUM 5 MG: 5 TABLET ORAL at 18:05

## 2017-11-30 NOTE — PLAN OF CARE
Problem: DISCHARGE PLANNING - CARE MANAGEMENT  Goal: Discharge to post-acute care or home with appropriate resources  INTERVENTIONS:  - Conduct assessment to determine patient/family and health care team treatment goals, and need for post-acute services based on payer coverage, community resources, and patient preferences, and barriers to discharge  - Address psychosocial, clinical, and financial barriers to discharge as identified in assessment in conjunction with the patient/family and health care team  - Arrange appropriate level of post-acute services according to patient's   needs and preference and payer coverage in collaboration with the physician and health care team  - Communicate with and update the patient/family, physician, and health care team regarding progress on the discharge plan  - Arrange appropriate transportation to post-acute venues  Outcome: Progressing  Patient will need rehab; referral made to [facility]

## 2017-11-30 NOTE — SOCIAL WORK
Patient open to [facility]; spoke with Lindsay Kwok at [facility]; informed her patient will be going to in rehab

## 2017-11-30 NOTE — PLAN OF CARE
Problem: OCCUPATIONAL THERAPY ADULT  Goal: Performs self-care activities at highest level of function for planned discharge setting  See evaluation for individualized goals  Treatment Interventions: ADL retraining, Functional transfer training, UE strengthening/ROM, Endurance training, Patient/family training, Compensatory technique education, Energy conservation, Activityengagement, Equipment evaluation/education          See flowsheet documentation for full assessment, interventions and recommendations  Limitation: Decreased ADL status, Decreased UE strength, Decreased endurance, Decreased self-care trans, Decreased high-level ADLs, Decreased Safe judgement during ADL, Decreased cognition  Prognosis: Fair  Assessment: Pt is a 80 y o  male seen for OT evaluation s/p admit to [address] on 11/28/2017 w/ s/p L rib fx from fall on 11/23, Lethargic  Comorbidities affecting pt's functional performance at time of assessment include: a-fib/SSS, cognitive decline  Personal factors affecting pt at time of IE include: lives alone  Prior to admission, pt was living alone at [facility] apartments and reports independent w/ ADLS, independent IADLs (managing medications and bills), independent mobility w/ [facility], daughter completes grocery shopping  Pt daughter reports pt had cognitive decline this month since fall resulting in hitting his head  Upon evaluation: Pt requires MIN assist x 2 rolling in bed, MIN assist x2 supine>sit bed mobility, MIN assist x2 sit<>Stand, MIN assist x 2 functional mobility w/ RW w/ increased assist needed during turns, MOD assist LB ADLs, MOD assist toileting 2* the following deficits impacting occupational performance: impaired cognition (STM, increased processing time, decreased insight), impaired balance, impaired activity tolerance, decreased strength and endurance   Pt to benefit from continued skilled OT tx while in the hospital to address deficits as defined above and maximize level of functional independence w ADL's and functional mobility  Occupational Performance areas to address include: grooming, bathing/shower, toilet hygiene, dressing, functional mobility and clothing management, formal cognitive assessment, fall safety education  From OT standpoint, recommendation at time of d/c would be short term rehab        OT Discharge Recommendation: Short Term Rehab         Comments: Krista Zhao MS, OTR/L

## 2017-11-30 NOTE — SOCIAL WORK
Patient was sleeping; met with daughter, Shaq Huckabay  Daughter tearful as she described cognitive her father seems to have over the past few weeks  Patient lives at [facility] apartments alone  Independent with ADL's and walks with a cane  Had a couple of falls past couple of weeks; daughter asking for rehab at [facility]  Referral made

## 2017-11-30 NOTE — PLAN OF CARE
Problem: PHYSICAL THERAPY ADULT  Goal: Performs mobility at highest level of function for planned discharge setting  See evaluation for individualized goals  Treatment/Interventions: Functional transfer training, LE strengthening/ROM, Therapeutic exercise, Endurance training, Patient/family training, Bed mobility, Gait training, Spoke to nursing, OT, Family  Equipment Recommended: Lui Romano (DOLLY)       See flowsheet documentation for full assessment, interventions and recommendations  Outcome: Progressing  Prognosis: Good  Problem List: Decreased strength, Decreased endurance, Impaired balance, Decreased mobility, Impaired judgement, Decreased safety awareness  Assessment: Pt  89 y o male who presented in ED on 11/23 for s/p fall w/ displaced L rib fx then D/C home  Pt presented back w/ lethargy  Pt referred to PT for mobility assessment & D/C planning  PTA/prior to falls this November, daughter reports that pt was I w/ ADL's & able to ambulate w/ [facility] however noted decline in function since s/p falls this month  On eval, pt demonstrate dec mobility, balance, endurance & amb  Pt require minAx2 for most functional mobility + cues for techniques  Gait deviations as above, unsteady but no gross LOB noted  Will continue skilled PT to improve function & safety  At this time, due to above mentioned deficits, home alone, advanced age & high risk for falls, pt will benefit from inpt rehab at D/C  CM to follow  Pt tolerated OOB in chair at end of session w/o issues  Call bell in reach  Chair alarm in placed & activated  Nsg staff to continue to mobilized pt (OOB in chair for all meals & ambulate in room/unit) as tolerated to prevent further decline in function  Nsg notified  Barriers to Discharge: Decreased caregiver support  Barriers to Discharge Comments: pt home alone  Recommendation: Short-term skilled PT     PT - OK to Discharge: Yes (to inpt rehab)    See flowsheet documentation for full assessment

## 2017-11-30 NOTE — OCCUPATIONAL THERAPY NOTE
[address] Evaluation     Patient Name: Abril GRACEYSB'V Date: 11/30/2017  Problem List  Patient Active Problem List   Diagnosis    Lethargic    Atrial fibrillation ([facility] )    SSS (sick sinus syndrome) ([facility] )    Closed traumatic displaced fracture of one rib of left side    Frequent falls    Cognitive decline     Past Medical History  Past Medical History:   Diagnosis Date    Atrial fibrillation ([facility] )     Atrial fibrillation ([facility] )     Hyperlipidemia      Past Surgical History  History reviewed  No pertinent surgical history  11/30/17 1246   Note Type   Note type Eval/Treat   Restrictions/Precautions   Weight Bearing Precautions Per Order No   Other Precautions Cognitive; Chair Alarm; Fall Risk;Hard of hearing   Pain Assessment   Pain Assessment No/denies pain   Pain Score No Pain   Home Living   Type of Home Apartment  ([facility] apartments)   Home Layout One level   Bathroom Shower/Tub Walk-in shower   Bathroom Toilet Standard   Bathroom Equipment Grab bars in shower   [address]   Additional Comments daughter transports to appointments and does grocery shopping   Prior Function   Level of [place] Independent with ADLs and functional mobility  (SPC)   Lives With fl[facility] in the last 6 months 1 to 4  (2)   Vocational Retired   Comments daughter reports since november pt w/ cog decline   Lifestyle   Autonomy per pt independent ADLs, independent IADLs, independent functional transfers w/ [address], daughter does driving   Reciprocal Relationships daughter and son   Service to Others  and    Intrinsic Gratification reading   ADL   Where Assessed Edge of bed   Eating Assistance 5  Supervision/Setup   Grooming Assistance 5  Supervision/Setup   UB Bathing Assistance 5  Supervision/Setup   LB Bathing Assistance 3  Moderate Assistance UB Dressing Assistance 4  Minimal Assistance   LB Dressing Assistance 3  Moderate Assistance   Toileting Assistance  2  Maximal Assistance   Bed Mobility   Rolling R 4  Minimal assistance   Additional items Assist x 2; Increased time required;Verbal cues;LE management; Bedrails   Rolling L 4  Minimal assistance   Additional items Assist x 2; Increased time required;Verbal cues;LE management; Bedrails   Supine to Sit 4  Minimal assistance   Additional items Assist x 2; Increased time required;Verbal cues;LE management;HOB elevated; Bedrails   Additional Comments VCs t/o tasks   Transfers   Sit to Stand 4  Minimal assistance   Additional items Assist x 2; Increased time required;Verbal cues   Stand to Sit 4  Minimal assistance   Additional items Assist x 2; Increased time required;Verbal cues;Armrests   Stand pivot 4  Minimal assistance   Additional items Assist x 2; Increased time required;Verbal cues;Armrests   Additional Comments VCs for positioning and increased assistance for turns   Functional Mobility   Functional Mobility 4  Minimal assistance   Additional Comments assist x 2   Additional items Rolling walker   Balance   Static Sitting Fair +   Dynamic Sitting Fair   Static Standing Fair -   Dynamic Standing Poor +   Ambulatory Poor +   Activity Tolerance   Activity Tolerance Patient limited by fatigue   Nurse Made Aware appropriate to see per Raine RIVAS   RUBO Assessment   RUE Assessment WFL  (4-/5)   LUE Assessment   LUE Assessment WFL  (4-/5)   Hand Function   Gross Motor Coordination Functional   Fine Motor Coordination Functional   Sensation   Light Touch No apparent deficits   Vision-Basic Assessment   Current Vision Wears glasses all the time   Vision - Complex Assessment   Ocular Range of Motion [facility]   Acuity Able to read clock/calendar on wall without difficulty   Cognition   Overall Cognitive Status [facility]   Arousal/Participation Alert; Cooperative   Attention Attends with cues to redirect   Orientation Level education  From OT standpoint, recommendation at time of d/c would be short term rehab  Goals   Patient Goals none stated, daughter wants pt to go to rehab   LTG Time Frame 7-10   Long Term Goal please see below goals   Plan   Treatment Interventions ADL retraining;Functional transfer training;UE strengthening/ROM; Endurance training;Patient/family training; Compensatory technique education; Energy conservation; Activityengagement;Equipment evaluation/education   Goal Expiration Date 12/10/17   OT Frequency 3-5x/wk   Recommendation   OT Discharge Recommendation Short Term Rehab   Barthel Index   Feeding 5   Bathing 0   Grooming Score 0   Dressing Score 5   Bladder Score 5   Bowels Score 5   Toilet Use Score 5   Transfers (Bed/Chair) Score 5   Mobility (Level Surface) Score 0   Stairs Score 0   Barthel Index Score 30   Modified Merrick Scale   Modified Merrick Scale 4     Occupational Therapy Goals to be met in 7-10 days:  1) Pt will improve activity tolerance to G for min 30 min txment sessions  2) Pt will complete ADLs/self care w/ supervison  3) Pt will complete toileting w/ supervision w/ G hygiene/thoroughness using DME PRN  4) Pt will improve functional transfers on/off all surfaces using DME PRN w/ G balance/safety including toileting w/ supervision  5) Pt will improve fx'l mobility during I/ADl/leisure tasks using DME PRN w/ g balance/safety w/ supervision  6) Pt will engage in ongoing cognitive assessment w/ G participation to A w/ safe d/c planning/recommendations  7) Pt will demonstrate G carryover of pt/caregiver education and training as appropriate w/ mod I  w/ G tolerance  8) Pt will demonstrate 100% carryover of E C  techniques w/ mod I t/o fx'l I/ADL/leisure tasks w/o cues s/p skilled education  9) Pt will demonstrate improved bed mobility to supervision  10) Pt will demonstrate improved standing tolerance to 3-5 minutes during functional tasks w/ no LOB to enhance ADL performance    Documentation completed by: Iliana Sweeney MS, OTR/L

## 2017-11-30 NOTE — PROGRESS NOTES
[facility] Progress Note  Patient: Christophe Reza 80 y o  male   MRN: [id number]  PCP: Cornelius Mcmullen MD  Unit/Bed#: [address] 224-02 Encounter: [id number]  Date Of Visit: 17    Assessment:    Principal Problem:    Lethargic  Active Problems:    Atrial fibrillation (HCC)    SSS (sick sinus syndrome) (HCC)    Closed traumatic displaced fracture of one rib of left side    Frequent falls    Cognitive decline      Plan:    · Toxic encephalopathy likely related to hydrocodone, pt seems to have improved  Will continue lidocaine patch and prn tramadol for pain  · Pt in pain 2/2 fractured rib when he coughs  · Await PT/OT to see patient; pt will go to inpatient rehab  · C/w Cpap at night  · Blood gas: pCo2 low, pO2 slightly elevated  · Pt: 30 5  · UA normal, TSH normal      VTE Pharmacologic Prophylaxis:   Pharmacologic: Warfarin (Coumadin)  Mechanical VTE Prophylaxis in Place: Yes    Patient Centered Rounds: I have performed bedside rounds with nursing staff today  Education and Discussions with Family / Patient: Patient and daughter    Time Spent for Care: 15 minutes  More than 50% of total time spent on counseling and coordination of care as described above  Current Length of Stay: 2 day(s)    Current Patient Status: Inpatient       Discharge Plan / Estimated Discharge Date: TBD    Code Status: Level 3 - DNAR and DNI      Subjective:   Pt seen at bedside and resting comfortably  Daughter is concerned at how much he is sleeping  He was able to wake up and eat breakfast and converse  He denies any pain  He denies NVFC or SOB today  Objective:     Vitals:   Temp (24hrs), Av 1 °F (36 7 °C), Min:97 5 °F (36 4 °C), Max:98 8 °F (37 1 °C)    HR:  [76-88] 77  Resp:  [16-19] 16  BP: (120-168)/(60-85) 161/79  SpO2:  [94 %-95 %] 95 %  There is no height or weight on file to calculate BMI  Input and Output Summary (last 24 hours):        Intake/Output Summary (Last 24 hours) at 11/30/17 1145  Last data filed at 11/30/17 0751   Gross per 24 hour   Intake                0 ml   Output             1125 ml   Net            -1125 ml       Physical Exam:     Physical Exam    General: well-developed, in no acute distress  Neck: supple, no lymphadenopathy  Heart: irregular rhythm, no murmer  Lungs: clear to auscultation b/l, no wheezing, rhonchi or rales  Abdomen: soft, non tender  Extremities: no clubbing, cyanosis, or edema  Neuro: awake, alert and oriented x3  Skin:warm and dry      Additional Data:     Labs:      Results from last 7 days  Lab Units 11/28/17  1708   WBC Thousand/uL 8 72   HEMOGLOBIN g/dL 15 3   HEMATOCRIT % 44 4   PLATELETS Thousands/uL 233   NEUTROS PCT % 58   LYMPHS PCT % 28   MONOS PCT % 7   EOS PCT % 6       Results from last 7 days  Lab Units 11/29/17  0433   SODIUM mmol/L 137   POTASSIUM mmol/L 4 4   CHLORIDE mmol/L 104   CO2 mmol/L 26   BUN mg/dL 18   CREATININE mg/dL 0 83   CALCIUM mg/dL 8 5   TOTAL PROTEIN g/dL 6 9   BILIRUBIN TOTAL mg/dL 0 97   ALK PHOS U/L 84   ALT U/L 27   AST U/L 56*   GLUCOSE RANDOM mg/dL 102       Results from last 7 days  Lab Units 11/29/17  0433   INR  2 87*       * I Have Reviewed All Lab Data Listed Above  * Additional Pertinent Lab Tests Reviewed: All Labs Within Last 24 Hours Reviewed        Recent Cultures (last 7 days):           Last 24 Hours Medication List:     ezetimibe 10 mg-pravastatin 40 mg combo dose  Oral HS   lidocaine 1 patch Transdermal Daily   warfarin 5 mg Oral Daily (warfarin)        Today, Patient Was Seen By: Francis Stanton DPM    ** Please Note: This note has been constructed using a voice recognition system   **

## 2017-11-30 NOTE — CASE MANAGEMENT
Initial Clinical Review    Admission: Date/Time/Statement: 11/28/17 @ 1811     Orders Placed This Encounter   Procedures    Inpatient Admission (expected length of stay for this patient is greater than two midnights)     Standing Status:   Standing     Number of Occurrences:   1     Order Specific Question:   Admitting Physician     Answer:   Shantanu Meza [[id number]]     Order Specific Question:   Level of Care     Answer:   Med Surg [16]     Order Specific Question:   Estimated length of stay     Answer:   More than 2 Midnights     Order Specific Question:   Certification     Answer:   I certify that inpatient services are medically necessary for this patient for a duration of greater than two midnights  See H&P and MD Progress Notes for additional information about the patient's course of treatment  ED: Date/Time/Mode of Arrival:   ED Arrival Information     Expected Arrival Acuity Means of Arrival Escorted By Service Admission Type    - 11/28/2017 14:44 Urgent Wheelchair Self General Medicine Urgent    Arrival Complaint    Lethargic          Chief Complaint:   Chief Complaint   Patient presents with    Fatigue     Daughter reports patient has been just falling asleep more than usual- while eating for examnple  Had been on Tylenolwith Codeine for fx rib, but has not any since 0830  History of Illness:    Karl Sutton is a 80 y o  male who presents with PMH of DYAN, AFib, sick sinus syndrome, hyperlipidemia  coming in the ED after recent visit for a fall that happened on 11/23/2017  Patient was seen and examined yesterday in the ER after a fall, and was found to have a displaced left 9th rib fracture    He also had a CT pan scan, his head was negative for acute hemorrhage ischemia or midline shift, cervical spinal is a negative for any malalignment or fracture, his chest abdomen pelvis with contrast demonstrated a the 9th rib fracture, however no acute intra-abdominal process, did do out honeycombing in the lungs and questionable interstitial thickening/edema, and lumbar spine demonstrated degenerative diseases but note no acute fracture, malalignment or subluxation      He was discharged home with low-dose Norco, and was still lethargic today which prompted his family to bring him in for evaluation  Currently patient's son is at bedside, however per report his daughter was also there previously was concerned that his lethargy still had improved and that he was a fall risk and was unsafe to live at home alone  Per the patient's son, the patient has been having lethargy for the last 2-3 weeks, but is the been worse over the last several days due to the falls  He does have sleep apnea which she is noncompliant for his CPAP mask  He also is taking frequent daytime naps      The patient denies any chest pain, shortness of breath lightheadedness or dizziness  Has no nausea vomiting fevers or chills or abdominal pain or diarrhea  No neck pain, no headache blurry vision or numbness or tingling or weakness in arms or legs  He does note that he has some pain in his left lateral mid back        ED Vital Signs:   ED Triage Vitals   Temperature Pulse Respirations Blood Pressure SpO2   11/28/17 1455 11/28/17 1455 11/28/17 1455 11/28/17 1455 11/28/17 1455   97 8 °F (36 6 °C) 74 16 137/71 99 %      Temp Source Heart Rate Source Patient Position - Orthostatic VS BP Location FiO2 (%)   11/28/17 1455 11/28/17 1624 11/28/17 1624 11/28/17 1624 --   Temporal Monitor Sitting Left arm       Pain Score       11/28/17 1455       No Pain        Wt Readings from Last 1 Encounters:   11/28/17 83 9 kg (185 lb)       Vital Signs (abnormal):    above    Abnormal Labs/Diagnostic Test Results:     PT   28 7     INR   2 66     PTT   65  CXR:   ILD    ED Treatment:   Medication Administration from 11/28/2017 1444 to 11/29/2017 1723       Date/Time Order Dose Route Action Action by Comments     11/28/2017 2226 ezetimibe 10 mg-pravastatin 40 mg combo dose   Oral Given Mickey Florentino RN      11/29/2017 0839 lidocaine (LIDODERM) 5 % patch 1 patch 1 patch Transdermal Medication Applied Kimberly Harley RN left side ribs     11/28/2017 2226 lidocaine (LIDODERM) 5 % patch 1 patch 0 patch Transdermal Hold Mickey Florentino RN Pt  refusing patch at this time  Past Medical/Surgical History: Active Ambulatory Problems     Diagnosis Date Noted    No Active Ambulatory Problems     Resolved Ambulatory Problems     Diagnosis Date Noted    No Resolved Ambulatory Problems     Past Medical History:   Diagnosis Date    Atrial fibrillation ([address] )     Atrial fibrillation (HCC)     Hyperlipidemia        Admitting Diagnosis: Lethargy [R53 83]  Lethargic [R53 83]  Mental status change [R41 82]  Cognitive decline [R41 89]  Increased weakness when ambulating [R53 1]  Recurrent falls [R29 6]    Age/Sex: 80 y o  male    · Assessment/Plan:    Lethargy  ? In the setting of uncontrolled DYAN, circadian rhythm disorder,narcotics from recent fall and displaced rib fracture, in elderly pt  ? Lab work unremarkable except as noted below, pt alert and oriented to hospital setting, month (w/prompting of recent holiday), oriented to year and president  When asked why he is here he says 'I fractured my ribs'  ? No focal neurodeficits, however given recurrent falls, pt's family concerned for safety, and is concerned pt isn't safe at home alone  ? Will admit to IP, consult CM, PT, OT  ?  Avoid narcotics if possible, encourage appropriate sleep cycle, encourage CPAP, consult geriatrics for further recommendations given concommittant cognitive decline     Plan for Additional Problems:   · A fib/SSS              Rate controlled off meds              Pt on coumadin INR therpeutic at 2 66              Given pt w/o PPM and frequent fallswill consult cardiolgy as pt known to dr Regina nAn,              Will monitor on telemetry for arrhythmias                Displaced left rib fracture              From prior falls, previously diagnosed              CXR pending official read, however no evidence of PTX              Supportive care, provide pain control  Frequent falls              Pt denies any pain although reports he occasionally gets pain in left posterolateral mid back at level of rib fx              No headache, neck pain, worsening back pain, numbness/tingling or unilateral weakness     ?IPF              Pt w/honey combing on recent CT CAP              No respiratory distress, good airway movement, pt 95% RA and not somnolent              VBG demonstrates pH 7 39, pCO2 44 9, however pO2 of 25 5 and O2 hgb of 49 0  Suspect this is a false reading given clinical appropriateness however will check CXR, will start NC to see if any improvement in lethargy, repeat VBG in AM, start continuous pulse ox              Recommend f/u with pulmonlogy upon d/c     HLD              Continue OP Vytorin     Cognitive decline              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 given mild microangiopathic changes on CT head              Will f/u with geriatric consult for establishment and for med recommendations given concommittant lethargy  Anticipated Length of Stay:  Patient will be admitted on an Inpatient basis with an anticipated length of stay of  Greater than 2 midnights     Justification for Hospital Stay: lethargy and frequent falls    Admission Orders:   IP    11/28  @    1811  Scheduled Meds:   ezetimibe 10 mg-pravastatin 40 mg combo dose  Oral HS   lidocaine 1 patch Transdermal Daily   warfarin 5 mg Oral Daily (warfarin)     Continuous Infusions:    PRN Meds:   acetaminophen    ondansetron    traMADol     Reg diet  O2  2L  PT/OT  Cons gerontology  tele

## 2017-11-30 NOTE — PHYSICAL THERAPY NOTE
PT EVALUATION    Pt  Name: Sharri Covarrubias  Pt  Age: 80 y o  Patient Active Problem List   Diagnosis    Lethargic    Atrial fibrillation (HCC)    SSS (sick [facility] syndrome) ([facility]    Closed traumatic displaced fracture of one rib of left side    Frequent falls    Cognitive decline       Past Medical History:   Diagnosis Date    [facility]     [facility]     Hyperlipidemia        History reviewed  No pertinent surgical history  11/30/17 1245   Note Type   Note type Eval only   Pain Assessment   Pain Score No Pain   Home Living   Type of Home Apartment  ([facility] independent [facility])   Home Layout One level   Home Equipment Cane   Prior Function   Level of [place] Independent with ADLs and functional mobility  (w/ SPC)   Lives With Alone   Receives Help From Family   ADL Assistance Independent   Falls in the last 6 months 1 to 4  (2x)   Comments per daughter, pt slow decline in function 11/2017   Restrictions/Precautions   Weight Bearing Precautions Per Order No   Other Precautions Fall Risk; Chair Alarm; Bed Alarm;Cognitive;Hard of hearing   General   Family/Caregiver Present Yes  (daughter)   Cognition   Overall Cognitive Status WFL   Arousal/Participation Alert   Orientation Level Oriented to person;Oriented to place;Oriented to time   Following Commands Follows one step commands with increased time or repetition   RUE Assessment   RUE Assessment (defer to OT)   LUE Assessment   LUE Assessment (defer to OT)   RLE Assessment   RLE Assessment WFL   Strength RLE   RLE Overall Strength 4-/5   LLE Assessment   LLE Assessment WFL   Strength LLE   LLE Overall Strength 4-/5   Coordination   Sensation WFL   Bed Mobility   Rolling R 4  Minimal assistance   Additional items Assist x 2;HOB elevated; Bedrails; Increased time required;Verbal cues;LE management   Rolling L 4  Minimal assistance   Additional items Assist x 2;HOB elevated; Bedrails; Increased time required;Verbal cues;LE management   Supine to Sit 4  Minimal assistance   Additional items Assist x 2; Increased time required;LE management;Verbal cues; Bedrails;HOB elevated   Additional Comments cues for techniques   Transfers   Sit to Stand 4  Minimal assistance   Additional items Assist x 2;HOB elevated; Bedrails; Increased time required;Verbal cues   Stand to Sit 4  Minimal assistance   Additional items Assist x 2; Increased time required;Verbal cues;Armrests   Additional Comments cues for techniques   Ambulation/Elevation   Gait pattern Decreased foot clearance; Wide SHAHBAZ; Short stride; Excessively slow   Gait Assistance 4  Minimal assist   Additional items Assist x 2;Verbal cues; Tactile cues   Assistive Device Rolling walker   Distance 80'x1   Balance   Static Sitting Fair   Static Standing Fair -   Ambulatory Poor +   Activity Tolerance   Activity Tolerance Patient limited by fatigue   Nurse Made Aware Raine   Assessment   Prognosis Good   Problem List Decreased strength;Decreased endurance; Impaired balance;Decreased mobility; Impaired judgement;Decreased safety awareness   Assessment Pt  89 y o male who presented in ED on 11/23 for s/p fall w/ displaced L rib fx then D/C home  Pt presented back w/ lethargy  Pt referred to PT for mobility assessment & D/C planning  PTA/prior to falls this November, daughter reports that pt was I w/ ADL's & able to ambulate w/ [facility] however noted decline in function since s/p falls this month  On eval, pt demonstrate dec mobility, balance, endurance & amb  Pt require minAx2 for most functional mobility + cues for techniques  Gait deviations as above, unsteady but no gross LOB noted  Will continue skilled PT to improve function & safety  At this time, due to above mentioned deficits, home alone, advanced age & high risk for falls, pt will benefit from inpt rehab at D/C  CM to follow  Pt tolerated OOB in chair at end of session w/o issues  Call bell in reach  Chair alarm in placed & activated   Nsg staff to continue to mobilized pt (OOB in chair for all meals & ambulate in room/unit) as tolerated to prevent further decline in function  Nsg notified  Barriers to Discharge Decreased caregiver support   Barriers to Discharge Comments pt home alone   Goals   Patient Goals none stated   STG Expiration Date 12/07/17   Short Term Goal #1 Goals to be met in 7 days: 1) inc strength & balance by 1/2 grade; 2) inc functional mobility to S; 3) inc amb w/ RW approx  >200' w/ S; 4) inc barthel score to 50; 5) pt/caregiver ed   Treatment Day 0   Plan   Treatment/Interventions Functional transfer training;LE strengthening/ROM; Therapeutic exercise; Endurance training;Patient/family training;Bed mobility;Gait training;Spoke to nursing;OT;Family   PT Frequency 5x/wk   Recommendation   Recommendation Short-term skilled PT   Equipment Recommended Walker  (RW)   PT - OK to Discharge Yes  (to inpt rehab)   Barthel Index   Feeding 5   Bathing 0   Grooming Score 0   Dressing Score 5   Bladder Score 5   Bowels Score 5   Toilet Use Score 5   Transfers (Bed/Chair) Score 5   Mobility (Level Surface) Score 0   Stairs Score 0   Barthel Index Score 30   Hx/personal factors: co-morbidities; fall risk; currently functioning below baseline; home alone; advanced age; use of AD  Examination: dec mobility, dec balance, dec endurance, dec amb, high risk for falls  Clinical: unpredictable (ongoing medical status; abnormal lab values; fall risk)  Complexity: high    Geno Duarte, PT

## 2017-12-01 VITALS
RESPIRATION RATE: 20 BRPM | TEMPERATURE: 99 F | HEART RATE: 71 BPM | SYSTOLIC BLOOD PRESSURE: 93 MMHG | OXYGEN SATURATION: 92 % | DIASTOLIC BLOOD PRESSURE: 51 MMHG | WEIGHT: 185 LBS

## 2017-12-01 PROBLEM — R53.83 LETHARGIC: Status: RESOLVED | Noted: 2017-11-28 | Resolved: 2017-12-01

## 2017-12-01 LAB — GLUCOSE SERPL-MCNC: 93 MG/DL (ref 65–140)

## 2017-12-01 PROCEDURE — 82948 REAGENT STRIP/BLOOD GLUCOSE: CPT

## 2017-12-01 RX ORDER — DOCUSATE SODIUM 100 MG/1
100 CAPSULE, LIQUID FILLED ORAL 2 TIMES DAILY
Qty: 10 CAPSULE | Refills: 0
Start: 2017-12-01

## 2017-12-01 RX ORDER — LIDOCAINE 50 MG/G
1 PATCH TOPICAL DAILY
Qty: 30 PATCH | Refills: 0
Start: 2017-12-02 | End: 2019-01-21 | Stop reason: ALTCHOICE

## 2017-12-01 RX ORDER — TRAMADOL HYDROCHLORIDE 50 MG/1
25 TABLET ORAL EVERY 6 HOURS PRN
Qty: 5 TABLET | Refills: 0 | Status: SHIPPED | OUTPATIENT
Start: 2017-12-01 | End: 2017-12-11

## 2017-12-01 RX ORDER — DOCUSATE SODIUM 100 MG/1
100 CAPSULE, LIQUID FILLED ORAL 2 TIMES DAILY
Status: DISCONTINUED | OUTPATIENT
Start: 2017-12-01 | End: 2017-12-01 | Stop reason: HOSPADM

## 2017-12-01 RX ORDER — ACETAMINOPHEN 325 MG/1
650 TABLET ORAL EVERY 4 HOURS PRN
Qty: 30 TABLET | Refills: 0
Start: 2017-12-01

## 2017-12-01 RX ADMIN — DOCUSATE SODIUM 100 MG: 100 CAPSULE, LIQUID FILLED ORAL at 10:58

## 2017-12-01 RX ADMIN — LIDOCAINE 1 PATCH: 50 PATCH CUTANEOUS at 08:35

## 2017-12-01 NOTE — DISCHARGE SUMMARY
Discharge Summary - Alpha Mixer 8/25/1923, [id number]        Admission Date: 11/28/2017  Discharge Date: 12/1/2017    Admitting Diagnosis: Lethargy [R53 83]  Lethargic [R53 83]  Mental status change [R41 82]  Cognitive decline [R41 89]  Increased weakness when ambulating [R53 1]  Recurrent falls [R29 6]    Discharge Diagnosis:   1  Toxic encephalopathy secondary to codeine  2  Recent fall with fractured left rib  3  Chronic atrial fibrillation  4  Cognitive decline  5  Hyperlipidemia  6  Obstructive sleep apnea    Consulting Physicians:   none    Procedures Performed:    none    HPI:  The patient is a 44-year-old man who recently fell and fractured 1 of his left ribs  At that time evaluation revealed no other significant injury  The patient was treated with Tylenol with codeine  Following this he became lethargic and confused  He was brought to the emergency room for further evaluation and was admitted for toxic encephalopathy  Hospital Course: The patient was admitted to the hospital and gently hydrated with intravenous fluid  He was withdrawn from codeine  He was treated with low-dose tramadol for pain  He was continued on a seated benefit in  Fortunately, the patient's mental status improved and he regained his usual baseline  He was evaluated by physical and occupational therapy  It was felt that he would benefit from inpatient rehabilitation  Arrangements were made for his transfer to [facility] for further care  During his stay, the patient's pain was adequately controlled on Tylenol and low-dose tramadol  His other  Medical issues remained stable during his hospitalization  On the day of discharge the patient was feeling pretty well  Vital signs were stable  Lungs were clear  Cardiac exam  Revealed an irregular rhythm  The abdomen was soft and nontender  There was no edema        Disposition: The patient was transferred to  for further care on December 1st   He will continue diet and activity as tolerated  He will be under the care of the physicians at [facility]  When he returns to his independent living, he will return turned to the care of Dr Criss Krabbe  Discharge instructions/Information to patient and family:   See after visit summary for information provided to patient and family  Provisions for Follow-Up Care:  See after visit summary for information related to follow-up care and any pertinent home health orders  Planned Readmission: No    Discharge Statement   I spent 40 minutes discharging the patient  This time was spent on the day of discharge  I had direct contact with the patient on the day of discharge  Discharge Medications:  See after visit summary for reconciled discharge medications provided to patient and family

## 2017-12-01 NOTE — PHYSICIAN ADVISOR
Current patient class: Inpatient  The patient is currently on Hospital Day: 4      The patient was admitted to the hospital at 1811 on 11/28/17 for the following diagnosis:  Lethargy [R53 83]  Lethargic [R53 83]  Mental status change [R41 82]  Cognitive decline [R41 89]  Increased weakness when ambulating [R53 1]  Recurrent falls [R29 6]       There is documentation in the medical record of an expected length of stay of at least 2 midnights  The patient is therefore expected to satisfy the 2 midnight benchmark and given the 2 midnight presumption is appropriate for INPATIENT ADMISSION  Given this expectation of a satisfying stay, CMS instructs us that the patient is most often appropriate for inpatient admission under part A provided medical necessity is documented in the chart  After review of the relevant documentation, labs, vital signs and test results, the patient is appropriate for INPATIENT ADMISSION  Admission to the hospital as an inpatient is a complex decision making process which requires the practitioner to consider the patients presenting complaint, history and physical examination and all relevant testing  With this in mind, in this case, the patient was deemed appropriate for INPATIENT ADMISSION  After review of the documentation and testing available at the time of the admission I concur with this clinical determination of medical necessity  Rationale is as follows: The patient is a 80 yrs old Male who presented to the ED at 11/28/2017  4:17 PM with a chief complaint of Fatigue (Daughter reports patient has been just falling asleep more than usual- while eating for examnple  Had been on Tylenolwith Codeine for fx rib, but has not any since 0830 )     The patient is a 80-year-old male admitted to the hospital for lethargy  Patient is currently taking pain medication for rib fracture    The patient was also noted to have atrial fibrillation, sick sinus syndrome, frequent falls and cognitive decline  The patient is appropriate for inpatient admission given satisfaction of the 2 midnight benchmark and need for a 2nd midnight in the hospital     The patients vitals on arrival were ED Triage Vitals   Temperature Pulse Respirations Blood Pressure SpO2   11/28/17 1455 11/28/17 1455 11/28/17 1455 11/28/17 1455 11/28/17 1455   97 8 °F (36 6 °C) 74 16 137/71 99 %      Temp Source Heart Rate Source Patient Position - Orthostatic VS BP Location FiO2 (%)   11/28/17 1455 11/28/17 1624 11/28/17 1624 11/28/17 1624 --   Temporal Monitor Sitting Left arm       Pain Score       11/28/17 1455       No Pain           Past Medical History:   Diagnosis Date    Atrial fibrillation (HCC)     Atrial fibrillation (HCC)     Hyperlipidemia      History reviewed  No pertinent surgical history          Consults have been placed to:   IP CONSULT TO CASE MANAGEMENT    Vitals:    11/30/17 0013 11/30/17 0749 11/30/17 1544 11/30/17 2338   BP: 168/80 161/79 132/72 142/68   Pulse: 84 77 72 67   Resp: 18 16 18 18   Temp: 97 5 °F (36 4 °C) 98 1 °F (36 7 °C) 98 9 °F (37 2 °C) 98 1 °F (36 7 °C)   TempSrc: Tympanic Tympanic Tympanic Tympanic   SpO2: 94% 95% 93% 95%   Weight:           Most recent labs:    Recent Labs      11/28/17   1708  11/29/17   0433   WBC  8 72   --    HGB  15 3   --    HCT  44 4   --    PLT  233   --    K  4 7  4 4   NA  139  137   CALCIUM  9 7  8 5   BUN  19  18   CREATININE  1 07  0 83   INR  2 66*  2 87*   AST  48*  56*   ALT  36  27   ALKPHOS  105  84   BILITOT  0 72  0 97       Scheduled Meds:  ezetimibe 10 mg-pravastatin 40 mg combo dose  Oral HS   lidocaine 1 patch Transdermal Daily   warfarin 5 mg Oral Daily (warfarin)     Continuous Infusions:   PRN Meds:   acetaminophen    ondansetron    traMADol    Surgical procedures (if appropriate):

## 2017-12-01 NOTE — PLAN OF CARE
DISCHARGE PLANNING     Discharge to home or other facility with appropriate resources Progressing        DISCHARGE PLANNING - CARE MANAGEMENT     Discharge to post-acute care or home with appropriate resources Progressing        PAIN - ADULT     Verbalizes/displays adequate comfort level or baseline comfort level Progressing        Potential for Falls     Patient will remain free of falls Progressing        Prexisting or High Potential for Compromised Skin Integrity     Skin integrity is maintained or improved Progressing        SAFETY ADULT     Maintain or return to baseline ADL function Progressing     Maintain or return mobility status to optimal level Progressing

## 2017-12-01 NOTE — SOCIAL WORK
IMM Notice presented to the patient, patient signed the notice, & I faxed it to [facility]/ [facility]

## 2017-12-01 NOTE — SOCIAL WORK
Patient accepted at [facility]; transport set up with [place] [facility] for 2 pm  MD, RN, patient, family and facility aware of transfer

## 2017-12-18 ENCOUNTER — APPOINTMENT (OUTPATIENT)
Dept: LAB | Facility: CLINIC | Age: 82
End: 2017-12-18
Payer: MEDICARE

## 2017-12-18 ENCOUNTER — TRANSCRIBE ORDERS (OUTPATIENT)
Dept: LAB | Facility: CLINIC | Age: 82
End: 2017-12-18

## 2017-12-18 ENCOUNTER — GENERIC CONVERSION - ENCOUNTER (OUTPATIENT)
Dept: OTHER | Facility: OTHER | Age: 82
End: 2017-12-18

## 2017-12-18 DIAGNOSIS — I48.91 ATRIAL FIBRILLATION, UNSPECIFIED TYPE (HCC): Primary | ICD-10-CM

## 2017-12-18 DIAGNOSIS — I48.91 ATRIAL FIBRILLATION, UNSPECIFIED TYPE (HCC): ICD-10-CM

## 2017-12-18 DIAGNOSIS — Z79.01 LONG-TERM (CURRENT) USE OF ANTICOAGULANTS: ICD-10-CM

## 2017-12-18 LAB
INR PPP: 2.17 (ref 0.86–1.16)
PROTHROMBIN TIME: 24.4 SECONDS (ref 12.1–14.4)

## 2017-12-18 PROCEDURE — 85610 PROTHROMBIN TIME: CPT

## 2017-12-18 PROCEDURE — 36415 COLL VENOUS BLD VENIPUNCTURE: CPT

## 2017-12-26 ENCOUNTER — GENERIC CONVERSION - ENCOUNTER (OUTPATIENT)
Dept: OTHER | Facility: OTHER | Age: 82
End: 2017-12-26

## 2018-01-09 NOTE — PROGRESS NOTES
REPORT NAME: Patient Visit Summary Report   VISIT DATE: 6/29/2016  VISIT TIME: 3:11 PM EDT  PATIENT NAME: Eduardo Hooper RECORD NUMBER: [id number]  SOCIAL SECURITY NUMBER:   YOB: 1923  AGE: 80  REFERRING PHYSICIAN: Yina Lugo MD  SUPERVISING CLINICIAN: Yina Lugo MD  HEALTH CARE PROFESSIONAL: Magali Downs  PATIENT HOME ADDRESS: [address] [address]  PATIENT HOME PHONE: [phone]  DIAGNOSIS 1: Unspecified atrial fibrillation / I48 91  DIAGNOSIS 2:   DIAGNOSIS 3:   DIAGNOSIS 4:   INR RANGE: 2 - 3  INR GOAL: 2 5  TREATMENT START DATE: 3/15/2012  TREATMENT END DATE:   NEXT VISIT: 7/27/2016  [facility]    VISIT RESULTS   ENCOUNTER NUMBER:   TEST LOCATION: [facility]  TEST TYPE: Outside Lab (Venipuncture)  VISIT TYPE: Phone Consult  CURRENT INR: 2 67 PROTIME: 28  SPECIMEN COL AND RPT DATE: 6/29/2016 3:11 PM  EDT    VITAL SIGNS  PULSE:  B/P:  WEIGHT:  HEIGHT:  TEMP:     CURRENT ANTICOAGULANT DOSING SCHEDULE  DOSE SIZE: 5mg    ANTICOAGULANT TYPE: WARFARIN  DOSING REGIMEN  Sun       Mon       Tues      Wed       Thurs     Fri       Sat  Total/Wk  2 5       2 5       2 5       5         2 5       2 5       2 5       20    PATIENT MEDICATION INSTRUCTION: Yes  PATIENT NUTRITIONAL COUNSELING: No  PATIENT BRUISING INSTRUCTION: No      LAST EDUCATION DATE:       PREVIOUS VISIT INFORMATION  VISITDATE   INR Goal  INR   Sun     Mon Tues Wed Thurs Fri  Sat     Total/wk  6/29/2016   2 5       2 67  2 5     2 5     2 5     5       2 5     2 5  2 5     20  6/6/2016    2 5       2 89  2 5     2 5     2 5     5       2 5     2 5  2 5     20  5/16/2016   2 5       2 93  2 5     2 5     2 5     5       2 5     2 5  2 5     20  4/19/2016   2 5       2 57  2 5     2 5     2 5     5       2 5     2 5  2 5     20    ADDITIONAL PREVIOUS VISIT INFORMATION  VISITDATE   PRIMARY RX               DOSE      CrCl  6/29/2016   WARFARIN 5mg                 6/6/2016    WARFARIN                 5mg                 5/16/2016   WARFARIN                 5mg                 4/19/2016   WARFARIN                 5mg                     OTHER CURRENT MEDICATIONS: WARFARIN      PROGRESS NOTES:     PATIENT INSTRUCTIONS: 6/29/16, TC PT, CONT CURRENT DOSE, Mets[place] 49 4 WEEKS,  7/27  FUNMI  TEST LOCATION: [facility]    Electronically signed by:  Janna Meigs Perinotti on 6/29/2016 at 3:11 PM EDT

## 2018-01-09 NOTE — RESULT NOTES
Verified Results  (1) PT WITH INR [id number] 08:22AM Dennie Ditch     Test Name Result Flag Reference   INR 2 78 H 0 86-1 16   PT 27 9 seconds H 11 8-14 1

## 2018-01-09 NOTE — PROGRESS NOTES
REPORT NAME: Progress Notes Report  VISIT DATE: 3/23/2017  VISIT TIME: 12:24 PM EDT  PATIENT NAME: Eduardo Saenz RECORD NUMBER: [id number]  YOB: 1923  AGE: 80  REFERRING PHYSICIAN: Josselin Ahumada MD  SUPERVISING CLINICIAN: Josselin Ahumada MD  HEALTH CARE PROVIDER: Rajni Downs  PATIENT HOME ADDRESS: [address] [address]  PATIENT HOME PHONE: [phone]  SOCIAL SECURITY NUMBER:   DIAGNOSIS 1: Unspecified atrial fibrillation / I48 91  DIAGNOSIS 2:   INR RANGE: 2 - 3  INR GOAL: 2 5  TREATMENT START DATE: 3/15/2012  TREATMENT END DATE:   NEXT VISIT: 4/20/2017  [facility]  VISIT RESULTS  ENCOUNTER NUMBER:   TEST LOCATION: [facility]  TEST TYPE: Outside Lab (Venipuncture)  VISIT TYPE: Phone Consult  CURRENT INR: 2 05 PROTIME: 22 9  SPECIMEN COL AND RPT DATE: 3/23/2017 12:24  PM EDT  VITAL SIGNS  PULSE:  BP: / WEIGHT:  HEIGHT:  TEMP:   CURRENT ANTICOAGULANT DOSING SCHEDULE  DOSE SIZE: 5mg    ANTICOAGULANT TYPE: WARFARIN  DOSING REGIMEN  Sun       Mon Tues Wed Thurs Fri       Sat  Total/Wk  2 5       2 5       2 5       2 5       2 5       2 5       2 5       17 5  PATIENT MEDICATION INSTRUCTION: Yes  PATIENT NUTRITIONAL COUNSELING: No  PATIENT BRUISING INSTRUCTION: Yes  LAST EDUCATION DATE:   PREVIOUS VISIT INFORMATION  VISITDATE  INRGoal INR   Sun    Mon Tues Wed Thurs Fri    Sat  Total/wk  3/23/2017   2 5     2 05  2 5    2 5    2 5    2 5    2 5    2 5    2 5  17 5  2/23/2017   2 5     2 24  2 5    2 5    2 5    2 5    2 5    2 5    2 5  17 5  1/26/2017   2 5     2 55  2 5    2 5    2 5    2 5    2 5    2 5    2 5  17 5  12/30/2016  2 5     2 28  2 5    2 5    2 5    2 5    2 5    2 5    2 5  17 5  ADDITIONAL PREVIOUS VISIT INFORMATION  VISITDATE   PRIMARY RX               DOSE      CrCl  3/23/2017   WARFARIN                 5mg  2/23/2017   WARFARIN                 5mg  1/26/2017   WARFARIN 5mg  12/30/2016  WARFARIN                 5mg  OTHER CURRENT MEDICATIONS:  WARFARIN  PROGRESS NOTES:   PATIENT INSTRUCTIONS: 3/23/17, tc pt, cont current dose, rech 4 weeks,  4/20  shannan  TEST LOCATION: [place] [address], , ,   INBOUND LAB DATA:  Lab       Lab Value Col Date                 Rpt Date                 Lab  Reference Range  Electronically signed by:  Debrah Prader Perinotti on 3/24/2017 12:24 PM EDT

## 2018-01-09 NOTE — PROGRESS NOTES
REPORT NAME: Progress Notes Report  VISIT DATE: 6/15/2017  VISIT TIME: 10:45 AM EDT  PATIENT NAME: Eduardo Leroy RECORD NUMBER: [id number]  YOB: 1923  AGE: 80  REFERRING PHYSICIAN: João Lozada MD  SUPERVISING CLINICIAN: João Lozada MD  HEALTH CARE PROVIDER: Romana [address]  PATIENT HOME ADDRESS: [address] [facility] [address] [facility] [address]  PATIENT HOME PHONE: [phone]  SOCIAL SECURITY NUMBER:   DIAGNOSIS 1: Unspecified atrial fibrillation / I48 91  DIAGNOSIS 2:   INR RANGE: 2 - 3  INR GOAL: 2 5  TREATMENT START DATE: 3/15/2012  TREATMENT END DATE:   NEXT VISIT: 7/13/2017  [facility]  VISIT RESULTS  ENCOUNTER NUMBER:   TEST LOCATION: [facility]  TEST TYPE: Outside Lab (Venipuncture)  VISIT TYPE: Phone Consult  CURRENT INR: 2 09 PROTIME: 23 7  SPECIMEN COL AND RPT DATE: 6/15/2017 10:45  AM EDT  VITAL SIGNS  PULSE:  BP: / WEIGHT:  HEIGHT:  TEMP:   CURRENT ANTICOAGULANT DOSING SCHEDULE  DOSE SIZE: 5mg    ANTICOAGULANT TYPE: WARFARIN  DOSING REGIMEN  Sun       Mon Tues Wed Thurs Fri       Sat  Total/Wk  2 5       2 5       2 5       2 5       2 5       2 5       2 5       17 5  PATIENT MEDICATION INSTRUCTION: Yes  PATIENT NUTRITIONAL COUNSELING: No  PATIENT BRUISING INSTRUCTION: Yes  LAST EDUCATION DATE:   PREVIOUS VISIT INFORMATION  VISITDATE  INRGoal INR   Sun    Mon Tues Wed Thurs Fri    Sat  Total/wk  6/15/2017   2 5     2 09  2 5    2 5    2 5    2 5    2 5    2 5    2 5  17 5  5/18/2017   2 5     1 82  2 5    2 5    2 5    2 5    2 5    2 5    2 5  17 5  4/20/2017   2 5     2 1   0      0      0      0      0      0      0  0  3/23/2017   2 5     2 05  2 5    2 5    2 5    2 5    2 5    2 5    2 5  17 5  ADDITIONAL PREVIOUS VISIT INFORMATION  VISITDATE   PRIMARY RX               DOSE      CrCl  6/15/2017   WARFARIN                 5mg  5/18/2017   WARFARIN                 5mg  4/20/2017   WARFARIN 5mg  3/23/2017   WARFARIN                 5mg  OTHER CURRENT MEDICATIONS:  WARFARIN  PROGRESS NOTES:   PATIENT INSTRUCTIONS: 6/16/17, TC PT, CONT 2 5 MG DAILY, RECH 4 WEEKS,  7/13  FUNMI  TEST LOCATION: [place] [address], , ,   INBOUND LAB DATA:  Lab       Lab Value Col Date                 Rpt Date                 Lab  Reference Range  Electronically signed by:  Cristiano Mccord on 6/16/2017 10:45 AM EDT

## 2018-01-10 NOTE — MISCELLANEOUS
Provider Comments  Provider Comments:   Pt was a no-show on 10 17  Called Pt to say we're putting him on the Dec w/l        Signatures   Electronically signed by : Yvonne Sheffield, ; Oct 18 2017  3:00PM EST                       (Administrative)

## 2018-01-10 NOTE — PROGRESS NOTES
REPORT NAME: Progress Notes Report  VISIT DATE: 7/13/2017  VISIT TIME: 3:31 PM EDT  PATIENT NAME: Eduardo Easley RECORD NUMBER: [id number]  YOB: 1923  AGE: 80  REFERRING PHYSICIAN: Jaison Parekh MD  SUPERVISING CLINICIAN: Jaison Parekh MD  [facility]  PATIENT HOME ADDRESS: [address] [facility] [address], [facility] [address]  PATIENT HOME PHONE: [phone]  SOCIAL SECURITY NUMBER:   DIAGNOSIS 1: Unspecified atrial fibrillation / I48 91  DIAGNOSIS 2:   INR RANGE: 2 - 3  INR GOAL: 2 5  TREATMENT START DATE: 3/15/2012  TREATMENT END DATE:   NEXT VISIT: 8/10/2017  [facility]  VISIT RESULTS  ENCOUNTER NUMBER:   TEST LOCATION: [facility]  TEST TYPE: Outside Lab (Venipuncture)  VISIT TYPE: Phone Consult  CURRENT INR: 2 22 PROTIME: 24 9  SPECIMEN COL AND RPT DATE: 7/13/2017 3:31  PM EDT  VITAL SIGNS  PULSE:  BP: / WEIGHT:  HEIGHT:  TEMP:   CURRENT ANTICOAGULANT DOSING SCHEDULE  DOSE SIZE: 5mg    ANTICOAGULANT TYPE: WARFARIN  DOSING REGIMEN  Sun       Mon Tues Wed Thurs Fri       Sat  Total/Wk  2 5       2 5       2 5       2 5       2 5       2 5       2 5       17 5  PATIENT MEDICATION INSTRUCTION: Yes  PATIENT NUTRITIONAL COUNSELING: No  PATIENT BRUISING INSTRUCTION: Yes  LAST EDUCATION DATE:   PREVIOUS VISIT INFORMATION  VISITDATE  INRGoal INR   Sun    Mon Tues Wed Thurs Fri    Sat  Total/wk  7/13/2017   2 5     2 22  2 5    2 5    2 5    2 5    2 5    2 5    2 5  17 5  6/15/2017   2 5     2 09  2 5    2 5    2 5    2 5    2 5    2 5    2 5  17 5  5/18/2017   2 5     1 82  2 5    2 5    2 5    2 5    2 5    2 5    2 5  17 5  4/20/2017   2 5     2 1   0      0      0      0      0      0      0  0  ADDITIONAL PREVIOUS VISIT INFORMATION  VISITDATE   PRIMARY RX               DOSE      CrCl  7/13/2017   WARFARIN                 5mg  6/15/2017   WARFARIN                 5mg  5/18/2017   WARFARIN 5mg  4/20/2017   WARFARIN                 5mg  OTHER CURRENT MEDICATIONS:  WARFARIN  PROGRESS NOTES:   PATIENT INSTRUCTIONS: 7/13/17, tc pt, lm am, cont current dose, rech 4  weeks, 8/10  shannan  TEST LOCATION: [place] [address], , ,   INBOUND LAB DATA:  Lab       Lab Value Col Date                 Rpt Date                 Lab  Reference Range  Electronically signed by:  Jona Mccord on 7/13/2017 3:31 PM EDT

## 2018-01-10 NOTE — RESULT NOTES
Verified Results  (1) PT WITH INR [id number] 08:16AM Kaylee Roper [facility]     Test Name Result Flag Reference   INR 1 79 H 0 86-1 16   PT 20 1 seconds H 11 8-14 1

## 2018-01-11 NOTE — RESULT NOTES
Verified Results  (1) PT WITH INR [id number] 08:22AM Luis Andrade     Test Name Result Flag Reference   INR 2 08 H 0 86-1 16   PT 23 6 seconds H 12 1-14 4

## 2018-01-11 NOTE — RESULT NOTES
Verified Results  (1) PT WITH INR [id number] 09:55AM Jennifer Rajan     Test Name Result Flag Reference   INR 2 57 H 0 86-1 16   PT 26 3 seconds H 11 8-14 1

## 2018-01-12 NOTE — PROGRESS NOTES
REPORT NAME: Patient Visit Summary Report   VISIT DATE: 3/8/2016  VISIT TIME: 10:07 AM EST  PATIENT NAME: Baldemar Mirza  MEDICAL RECORD NUMBER: [id number]  SOCIAL SECURITY NUMBER:   YOB: 1923  AGE: 80  REFERRING PHYSICIAN: Kranthi Lyon MD  SUPERVISING CLINICIAN: Kranthi Lyon MD  HEALTH CARE PROFESSIONAL: Ld Medinarosendo  PATIENT HOME ADDRESS: [address] [facility] [address], [facility], [place] [address]  PATIENT HOME PHONE: [phone]  DIAGNOSIS 1: Unspecified atrial fibrillation / I48 91  DIAGNOSIS 2:   DIAGNOSIS 3:   DIAGNOSIS 4:   INR RANGE: 2 - 3  INR GOAL: 2 5  TREATMENT START DATE: 3/15/2012  TREATMENT END DATE:   NEXT VISIT: 4/5/2016  [facility]    VISIT RESULTS   ENCOUNTER NUMBER:   [facility]  TEST TYPE: Outside Lab (Venipuncture)  VISIT TYPE:   CURRENT INR: 2 78 PROTIME:   SPECIMEN COL AND RPT DATE: 3/8/2016 10:07 AM  EST    VITAL SIGNS  PULSE:  B/P:  WEIGHT:  HEIGHT:  TEMP:     CURRENT ANTICOAGULANT DOSING SCHEDULE  DOSE SIZE: 5mg    ANTICOAGULANT TYPE: WARFARIN  DOSING REGIMEN  Sun       Mon Tues Wed Thurs Fri       Sat  Total/Wk  0         0         0         0         0         0         0         0    PATIENT MEDICATION INSTRUCTION: No  PATIENT NUTRITIONAL COUNSELING: No  PATIENT BRUISING INSTRUCTION: No      LAST EDUCATION DATE:       PREVIOUS VISIT INFORMATION  VISITDATE   INR Goal  INR   Sun     Mon Tues Wed Thurs Fri  Sat     Total/wk  3/8/2016    2 5       2 78  0       0       0       0       0       0  0       0  2/10/2016   2 5       2 88  2 5     2 5     2 5     5       2 5     2 5  2 5     20  1/13/2016   2 5       2 41  2 5     2 5     2 5     5       2 5     2 5  2 5     20  12/16/2015  2 5       2 8   2 5     2 5     2 5     5       2 5     2 5  2 5     20    ADDITIONAL PREVIOUS VISIT INFORMATION  VISITDATE   PRIMARY RX               DOSE      CrCl  3/8/2016    WARFARIN                 5mg 2/10/2016   WARFARIN                 5mg                 1/13/2016   WARFARIN                 5mg                 12/16/2015  WARFARIN                 5mg                     OTHER CURRENT MEDICATIONS: WARFARIN      PROGRESS NOTES:     PATIENT INSTRUCTIONS: pt called same dose recheck in 4 weeks      TEST LOCATION: [facility]    Electronically signed by: Fabiola Hurley on 3/9/2016 at 10:07 AM EST

## 2018-01-12 NOTE — RESULT NOTES
Verified Results  (1) PT WITH INR [id number] 08:17AM Georgia Model     Test Name Result Flag Reference   INR 2 17 H 0 86-1 16   PT 24 4 seconds H 12 1-14 4

## 2018-01-12 NOTE — RESULT NOTES
Verified Results  (1) PT WITH INR [id number] 08:22AM Gordo Mock     Test Name Result Flag Reference   INR 1 82 H 0 86-1 16   PT 21 2 seconds H 12 1-14 4

## 2018-01-12 NOTE — RESULT NOTES
Verified Results  (1) PT WITH INR [id number] 08:25AM Susana Russell     Test Name Result Flag Reference   INR 2 67 H 0 86-1 16   PT 28 0 seconds H 12 0-14 3

## 2018-01-12 NOTE — RESULT NOTES
Verified Results  (1) AST (SGOT) [id number] 08:33AM Vania Hardin Order Number: [id number]   Order Number: [id number] Order Number: [id number]     Test Name Result Flag Reference   AST(SGOT) 39 U/L  5-45     (1) LIPID PANEL, FASTING 06Jun2016 08:33AM Vania Hardin Order Number: [id number]   Order Number: [id number] Order Number: [id number]     Test Name Result Flag Reference   CHOLESTEROL 161 mg/dL     HDL,DIRECT 52 mg/dL  40-60   Specimen collection should occur prior to Metamizole administration due to the potential for falsely depressed results  LDL CHOLESTEROL CALCULATED 79 mg/dL  0-100   Triglyceride:         Normal              <150 mg/dl       Borderline High    150-199 mg/dl       High               200-499 mg/dl       Very High          >499 mg/dl  Cholesterol:         Desirable        <200 mg/dl      Borderline High  200-239 mg/dl      High             >239 mg/dl  HDL Cholesterol:        High    >59 mg/dL      Low     <41 mg/dL  LDL CALCULATED:    This screening LDL is a calculated result  It does not have the accuracy of the Direct Measured LDL in the monitoring of patients with hyperlipidemia and/or statin therapy  Direct Measure LDL (TOX387) must be ordered separately in these patients  TRIGLYCERIDES 150 mg/dL  <=150   Specimen collection should occur prior to N-Acetylcysteine or Metamizole administration due to the potential for falsely depressed results       (1) CBC/ PLT (NO DIFF) [id number] 08:33AM Vania Hardin Order Number: [id number]   Order Number: [id number]     Test Name Result Flag Reference   HEMATOCRIT 44 1 %  36 5-49 3   HEMOGLOBIN 15 1 g/dL  12 0-17 0   MCHC 34 2 g/dL  31 4-37 4   MCH 32 5 pg  26 8-34 3   MCV 95 fL  82-98   PLATELET COUNT 054 Thousands/uL  149-390   RBC COUNT 4 65 Million/uL  3 88-5 62   RDW 14 5 %  11 6-15 1   WBC COUNT 7 24 Thousand/uL  4 31-10 16   MPV 9 8 fL  8 9-12 7

## 2018-01-12 NOTE — RESULT NOTES
Verified Results  (1) PT WITH INR [id number] 08:26AM Kathryn Hicks     Test Name Result Flag Reference   INR 2 55 H 0 86-1 16   PT 27 0 seconds H 12 0-14 3

## 2018-01-12 NOTE — PROGRESS NOTES
REPORT NAME: Progress Notes Report  VISIT DATE: 4/20/2017  VISIT TIME: 12:11 PM EDT  PATIENT NAME: Eduardo Wong RECORD NUMBER: [id number]  YOB: 1923  AGE: 80  REFERRING PHYSICIAN: Bridgette Coleman MD  SUPERVISING CLINICIAN: Bridgette Coleman MD  HEALTH CARE PROVIDER: Le [facility] [address]  PATIENT HOME ADDRESS: [address] [facility] [address], [place] [address]  PATIENT HOME PHONE: [phone]  SOCIAL SECURITY NUMBER:   DIAGNOSIS 1: Unspecified atrial fibrillation / I48 91  DIAGNOSIS 2:   INR RANGE: 2 - 3  INR GOAL: 2 5  TREATMENT START DATE: 3/15/2012  TREATMENT END DATE:   NEXT VISIT: 5/18/2017  [facility]  VISIT RESULTS  ENCOUNTER NUMBER:   TEST LOCATION: [facility]  TEST TYPE: Outside Lab (Venipuncture)  VISIT TYPE: Phone Consult  CURRENT INR: 2 1 PROTIME: 23 3  SPECIMEN COL AND RPT DATE: 4/20/2017 12:11  PM EDT  VITAL SIGNS  PULSE:  BP: / WEIGHT:  HEIGHT:  TEMP:   CURRENT ANTICOAGULANT DOSING SCHEDULE  DOSE SIZE: 5mg    ANTICOAGULANT TYPE: WARFARIN  DOSING REGIMEN  Sun       Mon       Tues      Wed       Thurs     Fri       Sat  Total/Wk  0         0         0         0         0         0         0         0  PATIENT MEDICATION INSTRUCTION: Yes  PATIENT NUTRITIONAL COUNSELING: No  PATIENT BRUISING INSTRUCTION: Yes  LAST EDUCATION DATE:   PREVIOUS VISIT INFORMATION  VISITDATE  INRGoal INR   Sun    Mon Tues Wed Thurs Fri    Sat  Total/wk  4/20/2017   2 5     2 1   0      0      0      0      0      0      0  0  3/23/2017   2 5     2 05  2 5    2 5    2 5    2 5    2 5    2 5    2 5  17 5  2/23/2017   2 5     2 24  2 5    2 5    2 5    2 5    2 5    2 5    2 5  17 5  1/26/2017   2 5     2 55  2 5    2 5    2 5    2 5    2 5    2 5    2 5  17 5  ADDITIONAL PREVIOUS VISIT INFORMATION  VISITDATE   PRIMARY RX               DOSE      CrCl  4/20/2017   WARFARIN                 5mg  3/23/2017   WARFARIN                 5mg  2/23/2017   WARFARIN 5mg  1/26/2017   WARFARIN                 5mg  OTHER CURRENT MEDICATIONS:  WARFARIN  PROGRESS NOTES:   PATIENT INSTRUCTIONS: 4/21/17, tc pt, cont current dose, rech 4 weeks,  5/18  shannan  TEST LOCATION: [place] [address], , ,   INBOUND LAB DATA:  Lab       Lab Value Col Date                 Rpt Date                 Lab  Reference Range  Electronically signed by:  Misti Mccord on 4/21/2017 12:11 PM EDT

## 2018-01-13 NOTE — RESULT NOTES
Verified Results  (1) PT WITH INR [id number] 08:31AM Keaton Gibson     Test Name Result Flag Reference   INR 2 05 H 0 86-1 16   PT 22 9 seconds H 12 0-14 3

## 2018-01-13 NOTE — RESULT NOTES
Verified Results  (1) PT WITH INR [id number] 08:19AM Dennie Ditch     Test Name Result Flag Reference   INR 2 22 H 0 86-1 16   PT 24 9 seconds H 12 1-14 4

## 2018-01-13 NOTE — PROGRESS NOTES
REPORT NAME: Patient Visit Summary Report   VISIT DATE: 4/5/2016  VISIT TIME: 4:11 PM EDT  PATIENT NAME: Eduardo Oseguera RECORD NUMBER: [id number]  SOCIAL SECURITY NUMBER:   YOB: 1923  AGE: 80  REFERRING PHYSICIAN: Tita Casper MD  SUPERVISING CLINICIAN: Tita Casper MD  HEALTH CARE PROFESSIONAL: Adina Downs  PATIENT HOME ADDRESS: [address] [place] [address]  PATIENT HOME PHONE: [phone]  DIAGNOSIS 1: Unspecified atrial fibrillation / I48 91  DIAGNOSIS 2:   DIAGNOSIS 3:   DIAGNOSIS 4:   INR RANGE: 2 - 3  INR GOAL: 2 5  TREATMENT START DATE: 3/15/2012  TREATMENT END DATE:   NEXT VISIT: 4/19/2016  [facility]    VISIT RESULTS   ENCOUNTER NUMBER:   TEST LOCATION: [facility]  TEST TYPE: Outside Lab (Venipuncture)  VISIT TYPE: Phone Consult  CURRENT INR: 1 79 PROTIME: 20 1  SPECIMEN COL AND RPT DATE: 4/5/2016 4:11  PM EDT    VITAL SIGNS  PULSE:  B/P:  WEIGHT:  HEIGHT:  TEMP:     CURRENT ANTICOAGULANT DOSING SCHEDULE  DOSE SIZE: 5mg    ANTICOAGULANT TYPE: WARFARIN  DOSING REGIMEN  Sun       Mon Tues Wed Thurs Fri       Sat  Total/Wk  2 5       2 5       2 5       5         2 5       2 5       2 5       20    PATIENT MEDICATION INSTRUCTION: Yes  PATIENT NUTRITIONAL COUNSELING: No  PATIENT BRUISING INSTRUCTION: No      LAST EDUCATION DATE:       PREVIOUS VISIT INFORMATION  VISITDATE   INR Goal  INR   Sun     Mon Tues Wed Thurs Fri  Sat     Total/wk  4/5/2016    2 5       1 79  2 5     2 5     2 5     5       2 5     2 5  2 5     20  3/8/2016    2 5       2 78  0       0       0       0       0       0  0       0  2/10/2016   2 5       2 88  2 5     2 5     2 5     5       2 5     2 5  2 5     20  1/13/2016   2 5       2 41  2 5     2 5     2 5     5       2 5     2 5  2 5     20    ADDITIONAL PREVIOUS VISIT INFORMATION  VISITDATE   PRIMARY RX               DOSE      CrCl  4/5/2016    WARFARIN                 5mg 3/8/2016    WARFARIN                 5mg                 2/10/2016   WARFARIN                 5mg                 1/13/2016   WARFARIN                 5mg                     OTHER CURRENT MEDICATIONS: WARFARIN      PROGRESS NOTES:     PATIENT INSTRUCTIONS: 4/5/16, TC PT, MAY HAVE MISSED A DOSE, WILL TAKE 5 MG  TODAY IN PLACE OF 2 5 MG , THEN RESUME PREVIOUS DOSE, RECH 2 WEEKS, 4/19  FUNMI  TEST LOCATION: [facility] [address]    Electronically signed by:  Mahendra Mccord on 4/5/2016 at 4:11 PM EDT

## 2018-01-13 NOTE — PROGRESS NOTES
REPORT NAME: Progress Notes Report  VISIT DATE: 10/10/2017  VISIT TIME: 4:29 PM EDT  PATIENT NAME: Eduardo Benton RECORD NUMBER: [id number]  YOB: 1923  AGE: 80  REFERRING PHYSICIAN: Mike Kumar MD  SUPERVISING CLINICIAN: Mike Kumar MD  HEALTH CARE PROVIDER: Max Downs  PATIENT HOME ADDRESS: [address] [place] [address]  PATIENT HOME PHONE: [phone]  SOCIAL SECURITY NUMBER:   DIAGNOSIS 1: Unspecified atrial fibrillation / I48 91  DIAGNOSIS 2:   INR RANGE: 2 - 3  INR GOAL: 2 5  TREATMENT START DATE: 3/15/2012  TREATMENT END DATE:   NEXT VISIT: 12/11/2017  [facility]  VISIT RESULTS  ENCOUNTER NUMBER:   TEST LOCATION: [facility]  TEST TYPE: Outside Lab (Venipuncture)  VISIT TYPE: Phone Consult  CURRENT INR: 2 25 PROTIME: 25 1  SPECIMEN COL AND RPT DATE: 10/10/2017 4:29  PM EDT  VITAL SIGNS  PULSE:  BP: / WEIGHT:  HEIGHT:  TEMP:   CURRENT ANTICOAGULANT DOSING SCHEDULE  DOSE SIZE: 5mg    ANTICOAGULANT TYPE: WARFARIN  DOSING REGIMEN  Sun       Mon Tues Wed Thurs Fri       Sat  Total/Wk  2 5       2 5       2 5       2 5       2 5       2 5       2 5       17 5  PATIENT MEDICATION INSTRUCTION: Yes  PATIENT NUTRITIONAL COUNSELING: No  PATIENT BRUISING INSTRUCTION: Yes  LAST EDUCATION DATE:   PREVIOUS VISIT INFORMATION  VISITDATE  INRGoal INR   Sun    Mon Tues Wed Thurs Fri    Sat  Total/wk  10/10/2017  2 5     2 25  2 5    2 5    2 5    2 5    2 5    2 5    2 5  17 5  9/7/2017    2 5     2 17  2 5    2 5    2 5    2 5    2 5    2 5    2 5  17 5  8/10/2017   2 5     2 08  2 5    2 5    2 5    2 5    2 5    2 5    2 5  17 5  7/13/2017   2 5     2 22  2 5    2 5    2 5    2 5    2 5    2 5    2 5  17 5  ADDITIONAL PREVIOUS VISIT INFORMATION  VISITDATE   PRIMARY RX               DOSE      CrCl  10/10/2017  WARFARIN                 5mg  9/7/2017    WARFARIN                 5mg  8/10/2017   WARFARIN 5mg  7/13/2017   WARFARIN                 5mg  OTHER CURRENT MEDICATIONS:  WARFARIN  PROGRESS NOTES:   PATIENT INSTRUCTIONS: 10/10/17, tc pt, lm am, cont current dose, rech 4  weeks, 11/7  shannan   ---- Additional Instructions entered on 12/1/2017 3:57 PM EST by Leslie Mccord :  12/1/17, pt discharged from [facility] , s/p fall, transfered to  [facility] [place]  shannan  ---- Additional Instructions entered on 12/13/2017 3:20 PM EST by Leslie Mccord :  phone call from nurse practioner at rehab, awilda jones    [phone]   pty d/c'd home 12/14  inr 1 5 12/13  will take 3 mg x 2 , then resume 2 5  mg daily  inr due mon 12/18 will have iva mares, to follow  shannan  TEST LOCATION: [facility], , ,   INBOUND LAB DATA:  Lab       Lab Value Col Date                 Rpt Date                 Lab  Reference Range  Electronically signed by:  Leslie Mccord on 12/13/2017 3:20 PM EST

## 2018-01-13 NOTE — PROGRESS NOTES
REPORT NAME: Patient Visit Summary Report   VISIT DATE: 11/28/2016  VISIT TIME: 4:19 PM EST  PATIENT NAME: Eduardo Ferrell RECORD NUMBER: [id number]  SOCIAL SECURITY NUMBER:   YOB: 1923  AGE: 80  REFERRING PHYSICIAN: Palma Macias MD  SUPERVISING CLINICIAN: Palma Macias MD  HEALTH CARE PROFESSIONAL: Betsy Rapp  PATIENT HOME ADDRESS: [address] [facility] [address], [place] [address]  PATIENT HOME PHONE: [phone]  DIAGNOSIS 1: Unspecified atrial fibrillation / I48 91  DIAGNOSIS 2:   DIAGNOSIS 3:   DIAGNOSIS 4:   INR RANGE: 2 - 3  INR GOAL: 2 5  TREATMENT START DATE: 3/15/2012  TREATMENT END DATE:   NEXT VISIT: 12/27/2016  [facility]    VISIT RESULTS   ENCOUNTER NUMBER:   TEST LOCATION: [facility]  TEST TYPE: Outside Lab (Venipuncture)  VISIT TYPE:   CURRENT INR: 2 39 PROTIME:   SPECIMEN COL AND RPT DATE: 11/28/2016 4:19 PM  EST    VITAL SIGNS  PULSE:  B/P:  WEIGHT:  HEIGHT:  TEMP:     CURRENT ANTICOAGULANT DOSING SCHEDULE  DOSE SIZE: 5mg    ANTICOAGULANT TYPE: WARFARIN  DOSING REGIMEN  Sun       Mon Tues Wed Thurs Fri       Sat  Total/Wk  2 5       2 5       2 5       2 5       2 5       2 5       2 5       17 5    PATIENT MEDICATION INSTRUCTION: No  PATIENT NUTRITIONAL COUNSELING: No  PATIENT BRUISING INSTRUCTION: No      LAST EDUCATION DATE:       PREVIOUS VISIT INFORMATION  VISITDATE   INR Goal  INR   Sun     Mon Tues Wed Thurs Fri  Sat     Total/wk  11/28/2016  2 5       2 39  2 5     2 5     2 5     2 5     2 5     2 5  2 5     17 5  10/25/2016  2 5       2 25  2 5     2 5     2 5     2 5     2 5     2 5  2 5     17 5  10/4/2016   2 5       2 42  2 5     2 5     2 5     2 5     2 5     2 5  2 5     17 5  9/20/2016   2 5       3 12  2 5     2 5     2 5     2 5     2 5     2 5  2 5     17 5    ADDITIONAL PREVIOUS VISIT INFORMATION  VISITDATE   PRIMARY RX               DOSE      CrCl  11/28/2016  WARFARIN                 5mg 10/25/2016  WARFARIN                 5mg                 10/4/2016   WARFARIN                 5mg                 9/20/2016   WARFARIN                 5mg                     OTHER CURRENT MEDICATIONS: WARFARIN      PROGRESS NOTES:     PATIENT INSTRUCTIONS: pt called same dose recheck in 4 weeks    TEST LOCATION: [place] [address]    Electronically signed by: Elizabeth Lobo on 11/28/2016 at 4:19 PM EST

## 2018-01-13 NOTE — PROGRESS NOTES
REPORT NAME: Patient Visit Summary Report   VISIT DATE: 5/16/2016  VISIT TIME: 2:52 PM EDT  PATIENT NAME: Eduardo Artis RECORD NUMBER: [id number]  SOCIAL SECURITY NUMBER:   YOB: 1923  AGE: 80  REFERRING PHYSICIAN: Peter Morales MD  SUPERVISING CLINICIAN: Peter Morales MD  HEALTH CARE PROFESSIONAL: Cristiano Downs  PATIENT HOME ADDRESS: [address] [place] [address]  PATIENT HOME PHONE: [phone]  DIAGNOSIS 1: Unspecified atrial fibrillation / I48 91  DIAGNOSIS 2:   DIAGNOSIS 3:   DIAGNOSIS 4:   INR RANGE: 2 - 3  INR GOAL: 2 5  TREATMENT START DATE: 3/15/2012  TREATMENT END DATE:   NEXT VISIT: 6/6/2016  [facility]    VISIT RESULTS   ENCOUNTER NUMBER:   TEST LOCATION: [facility]  TEST TYPE: Outside Lab (Venipuncture)  VISIT TYPE: Phone Consult  CURRENT INR: 2 93 PROTIME: 29  SPECIMEN COL AND RPT DATE: 5/16/2016 2:52 PM  EDT    VITAL SIGNS  PULSE:  B/P:  WEIGHT:  HEIGHT:  TEMP:     CURRENT ANTICOAGULANT DOSING SCHEDULE  DOSE SIZE: 5mg    ANTICOAGULANT TYPE: WARFARIN  DOSING REGIMEN  Sun       Mon Tues Wed Thurs Fri       Sat  Total/Wk  2 5       2 5       2 5       5         2 5       2 5       2 5       20    PATIENT MEDICATION INSTRUCTION: Yes  PATIENT NUTRITIONAL COUNSELING: No  PATIENT BRUISING INSTRUCTION: Yes      LAST EDUCATION DATE:       PREVIOUS VISIT INFORMATION  VISITDATE   INR Goal  INR   Sun     Mon Tues Wed Thurs Fri  Sat     Total/wk  5/16/2016   2 5       2 93  2 5     2 5     2 5     5       2 5     2 5  2 5     20  4/19/2016   2 5       2 57  2 5     2 5     2 5     5       2 5     2 5  2 5     20  4/5/2016    2 5       1 79  2 5     2 5     2 5     5       2 5     2 5  2 5     20  3/8/2016    2 5       2 78  0       0       0       0       0       0  0       0    ADDITIONAL PREVIOUS VISIT INFORMATION  VISITDATE   PRIMARY RX               DOSE      CrCl  5/16/2016   WARFARIN                 5mg 4/19/2016   WARFARIN                 5mg                 4/5/2016    WARFARIN                 5mg                 3/8/2016    WARFARIN                 5mg                     OTHER CURRENT MEDICATIONS: WARFARIN      PROGRESS NOTES:     PATIENT INSTRUCTIONS: 5/16/16, tc pt, denies any bleeding, changes in  health or meds  will cont current dose, rech 3 weeks, 6/6  shannan  TEST LOCATION: [facility] [address]    Electronically signed by:  Mira Mccord on 5/16/2016 at 2:52 PM EDT

## 2018-01-14 NOTE — PROGRESS NOTES
REPORT NAME: Patient Visit Summary Report   VISIT DATE: 2/23/2017  VISIT TIME: 3:56 PM EST  PATIENT NAME: Kathryn Huerta  MEDICAL RECORD NUMBER: [id number]  SOCIAL SECURITY NUMBER:   YOB: 1923  AGE: 80  REFERRING PHYSICIAN: Lamberto Rico MD  SUPERVISING CLINICIAN: Lamberto Rico MD  HEALTH CARE PROFESSIONAL: Rod AZAR [facility]  PATIENT HOME ADDRESS: [address] [place] [address]  PATIENT HOME PHONE: [phone]  DIAGNOSIS 1: Unspecified atrial fibrillation / I48 91  DIAGNOSIS 2:   DIAGNOSIS 3:   DIAGNOSIS 4:   INR RANGE: 2 - 3  INR GOAL: 2 5  TREATMENT START DATE: 3/15/2012  TREATMENT END DATE:   NEXT VISIT: 3/23/2017  [facility]    VISIT RESULTS   ENCOUNTER NUMBER:   TEST LOCATION: [facility]  TEST TYPE: Outside Lab (Venipuncture)  VISIT TYPE: Phone Consult  CURRENT INR: 2 24 PROTIME: 24 5  SPECIMEN COL AND RPT DATE: 2/23/2017 3:56  PM EST    VITAL SIGNS  PULSE:  B/P:  WEIGHT:  HEIGHT:  TEMP:     CURRENT ANTICOAGULANT DOSING SCHEDULE  DOSE SIZE: 5mg    ANTICOAGULANT TYPE: WARFARIN  DOSING REGIMEN  Sun       Mon       Tues      Wed       Thurs     Fri       Sat  Total/Wk  2 5       2 5       2 5       2 5       2 5       2 5       2 5       17 5    PATIENT MEDICATION INSTRUCTION: Yes  PATIENT NUTRITIONAL COUNSELING: No  PATIENT BRUISING INSTRUCTION: No      LAST EDUCATION DATE:       PREVIOUS VISIT INFORMATION  VISITDATE   INR Goal  INR   Sun     Mon Tues Wed Thurs Fri  Sat     Total/wk  2/23/2017   2 5       2 24  2 5     2 5     2 5     2 5     2 5     2 5  2 5     17 5  1/26/2017   2 5       2 55  2 5     2 5     2 5     2 5     2 5     2 5  2 5     17 5  12/30/2016  2 5       2 28  2 5     2 5     2 5     2 5     2 5     2 5  2 5     17 5  11/28/2016  2 5       2 39  2 5     2 5     2 5     2 5     2 5     2 5  2 5     17 5    ADDITIONAL PREVIOUS VISIT INFORMATION  VISITDATE   PRIMARY RX               DOSE      CrCl  2/23/2017   WARFARIN 5mg                 1/26/2017   WARFARIN                 5mg                 12/30/2016  WARFARIN                 5mg                 11/28/2016  WARFARIN                 5mg                     OTHER CURRENT MEDICATIONS: WARFARIN      PROGRESS NOTES:     PATIENT INSTRUCTIONS: 2/23/17, tc pt, lm am, cont current dose, rech 4  weeks, 3/23  shannan  TEST LOCATION: [facility] [address]    Electronically signed by:  Claire Mccord on 2/23/2017 at 3:56 PM EST

## 2018-01-15 LAB — INR PPP: 2.2 (ref 0.86–1.16)

## 2018-01-15 NOTE — PROGRESS NOTES
REPORT NAME: Patient Visit Summary Report   VISIT DATE: 1/13/2016  VISIT TIME: 3:22 PM EST  PATIENT NAME: Eduardo Humphrey RECORD NUMBER: [id number]  SOCIAL SECURITY NUMBER:   YOB: 1923  AGE: 80  REFERRING PHYSICIAN: Lucero Cazares MD  SUPERVISING CLINICIAN: Lucero Cazares MD  HEALTH CARE PROFESSIONAL: Arlin Llanes [facility]  PATIENT HOME ADDRESS: [address] [facility] [address], [facility], [place] [address]  PATIENT HOME PHONE: [phone]  DIAGNOSIS 1: Unspecified atrial fibrillation / I48 91  DIAGNOSIS 2:   DIAGNOSIS 3:   DIAGNOSIS 4:   INR RANGE: 2 - 3  INR GOAL: 2 5  TREATMENT START DATE: 3/15/2012  TREATMENT END DATE:   NEXT VISIT: 2/10/2016  [facility]    VISIT RESULTS   ENCOUNTER NUMBER:   TEST LOCATION: [facility]  TEST TYPE: Outside Lab (Venipuncture)  VISIT TYPE: Phone Consult  CURRENT INR: 2 41 PROTIME: 25 1  SPECIMEN COL AND RPT DATE: 1/13/2016 3:22  PM EST    VITAL SIGNS  PULSE:  B/P:  WEIGHT:  HEIGHT:  TEMP:     CURRENT ANTICOAGULANT DOSING SCHEDULE  DOSE SIZE: 5mg    ANTICOAGULANT TYPE: WARFARIN  DOSING REGIMEN  Sun       Mon       Tues      Wed       Thurs     Fri       Sat  Total/Wk  2 5       2 5       2 5       5         2 5       2 5       2 5       20    PATIENT MEDICATION INSTRUCTION: Yes  PATIENT NUTRITIONAL COUNSELING: No  PATIENT BRUISING INSTRUCTION: Yes      LAST EDUCATION DATE:       PREVIOUS VISIT INFORMATION  VISIT DATE  PRIMARY RX               DOSE   INR GOAL  CrCl    INR  1/13/2016   WARFARIN                 5mg        2 5            2 41     REGIMEN S:2 5   M:2 5   T:2 5   W:5     T:2 5   F:2 5   S:2 5   WK:20 12/16/2015  WARFARIN                 5mg        2 5            2 8      REGIMEN S:2 5   M:2 5   T:2 5   W:5     T:2 5   F:2 5   S:2 5   WK:20 11/18/2015  WARFARIN                 5mg        2 5            2 41     REGIMEN S:2 5   M:2 5   T:2 5   W:5     T:2 5   F:2 5   S:2 5   WK:20     10/21/2015  WARFARIN                 5mg        2 5 2  50     REGIMEN S:2 5   M:2 5   T:2 5   W:5     T:2 5   F:2 5   S:2 5   WK:20         OTHER CURRENT MEDICATIONS: WARFARIN      PROGRESS NOTES:     PATIENT INSTRUCTIONS: 1/13/16, tc pt, cont current dose, rech 4 weeks  deneis any changes in health or bleeding  shannan  TEST LOCATION: [facility]    Electronically signed by:  Arlin Mccord on 1/13/2016 at 3:22 PM EST

## 2018-01-15 NOTE — RESULT NOTES
Verified Results  (1) PT WITH INR [id number] 08:39AM Luis Andrade     Test Name Result Flag Reference   INR 2 25 H 0 86-1 16   PT 24 6 seconds H 12 0-14 3

## 2018-01-15 NOTE — RESULT NOTES
Verified Results  (1) PT WITH INR [id number] 08:41AM Abiolaa Ion     Test Name Result Flag Reference   INR 2 88 H 0 86-1 16   PT 28 6 seconds H 11 8-14 1

## 2018-01-15 NOTE — RESULT NOTES
Verified Results  (1) PT WITH INR [id number] 02:23PM Paras Frankel     Test Name Result Flag Reference   INR 2 41 H 0 86-1 16   PT 25 1 seconds H 11 8-14 1

## 2018-01-15 NOTE — RESULT NOTES
Verified Results  (1) PT WITH INR [id number] 08:33AM Low Anaya     Test Name Result Flag Reference   INR 2 89 H 0 86-1 16   PT 29 7 seconds H 12 0-14 3

## 2018-01-15 NOTE — PROGRESS NOTES
REPORT NAME: Patient Visit Summary Report   VISIT DATE: 8/23/2016  VISIT TIME: 11:28 AM EDT  PATIENT NAME: Eduardo Flores RECORD NUMBER: [id number]  SOCIAL SECURITY NUMBER:   YOB: 1923  AGE: 80  REFERRING PHYSICIAN: Odalys Valdez MD  SUPERVISING CLINICIAN: Odalys Valdez MD  HEALTH CARE PROFESSIONAL: [facility] Kenneth Downs  PATIENT HOME ADDRESS: [address] [facility] [address] [address]  PATIENT HOME PHONE: [phone]  DIAGNOSIS 1: Unspecified atrial fibrillation / I48 91  DIAGNOSIS 2:   DIAGNOSIS 3:   DIAGNOSIS 4:   INR RANGE: 2 - 3  INR GOAL: 2 5  TREATMENT START DATE: 3/15/2012  TREATMENT END DATE:   NEXT VISIT: 9/20/2016  [facility]    VISIT RESULTS   ENCOUNTER NUMBER:   TEST LOCATION: [facility]  TEST TYPE: Outside Lab (Venipuncture)  VISIT TYPE: Phone Consult  CURRENT INR: 2 65 PROTIME: 27 8  SPECIMEN COL AND RPT DATE: 8/23/2016 11:28  AM EDT    VITAL SIGNS  PULSE:  B/P:  WEIGHT:  HEIGHT:  TEMP:     CURRENT ANTICOAGULANT DOSING SCHEDULE  DOSE SIZE: 5mg    ANTICOAGULANT TYPE: WARFARIN  DOSING REGIMEN  Sun       Mon       Tues      Wed       Thurs     Fri       Sat  Total/Wk  2 5       2 5       2 5       5         2 5       2 5       2 5       20    PATIENT MEDICATION INSTRUCTION: Yes  PATIENT NUTRITIONAL COUNSELING: No  PATIENT BRUISING INSTRUCTION: Yes      LAST EDUCATION DATE:       PREVIOUS VISIT INFORMATION  VISITDATE   INR Goal  INR   Sun     Mon Tues Wed Thurs Fri  Sat     Total/wk  8/23/2016   2 5       2 65  2 5     2 5     2 5     5       2 5     2 5  2 5     20  7/26/2016   2 5       2 9   2 5     2 5     2 5     5       2 5     2 5  2 5     20  6/29/2016   2 5       2 67  2 5     2 5     2 5     5       2 5     2 5  2 5     20  6/6/2016    2 5       2 89  2 5     2 5     2 5     5       2 5     2 5  2 5     20    ADDITIONAL PREVIOUS VISIT INFORMATION  VISITDATE   PRIMARY RX               DOSE      CrCl  8/23/2016   WARFARIN 5mg                 7/26/2016   WARFARIN                 5mg                 6/29/2016   WARFARIN                 5mg                 6/6/2016    WARFARIN                 5mg                     OTHER CURRENT MEDICATIONS: WARFARIN      PROGRESS NOTES:     PATIENT INSTRUCTIONS: 8/24/16, tc pt, lm am, cont current dose, rech 4  weeks, 9/20  shannan  TEST LOCATION: [facility]    Electronically signed by:  Max Mccord on 8/24/2016 at 11:28 AM EDT

## 2018-01-15 NOTE — PROGRESS NOTES
REPORT NAME: Progress Notes Report  VISIT DATE: 8/10/2017  VISIT TIME: 4:19 PM EDT  PATIENT NAME: Navin Oneil  MEDICAL RECORD NUMBER: [id number]  YOB: 1923  AGE: 80  REFERRING PHYSICIAN: Josselin Ahumada MD  SUPERVISING CLINICIAN: Josselin Ahumada MD  HEALTH CARE PROVIDER: Rajni Downs  PATIENT HOME ADDRESS: [address] [place] [address]  PATIENT HOME PHONE: [phone]  SOCIAL SECURITY NUMBER:   DIAGNOSIS 1: Unspecified atrial fibrillation / I48 91  DIAGNOSIS 2:   INR RANGE: 2 - 3  INR GOAL: 2 5  TREATMENT START DATE: 3/15/2012  TREATMENT END DATE:   NEXT VISIT: 9/7/2017  [facility]  VISIT RESULTS  ENCOUNTER NUMBER:   TEST LOCATION: [facility]  TEST TYPE: Outside Lab (Venipuncture)  VISIT TYPE: Phone Consult  CURRENT INR: 2 08 PROTIME: 23 6  SPECIMEN COL AND RPT DATE: 8/10/2017 4:19  PM EDT  VITAL SIGNS  PULSE:  BP: / WEIGHT:  HEIGHT:  TEMP:   CURRENT ANTICOAGULANT DOSING SCHEDULE  DOSE SIZE: 5mg    ANTICOAGULANT TYPE: WARFARIN  DOSING REGIMEN  Sun       Mon Tues Wed Thurs Fri       Sat  Total/Wk  2 5       2 5       2 5       2 5       2 5       2 5       2 5       17 5  PATIENT MEDICATION INSTRUCTION: Yes  PATIENT NUTRITIONAL COUNSELING: No  PATIENT BRUISING INSTRUCTION: Yes  LAST EDUCATION DATE:   PREVIOUS VISIT INFORMATION  VISITDATE  INRGoal INR   Sun    Mon Tues Wed Thurs Fri    Sat  Total/wk  8/10/2017   2 5     2 08  2 5    2 5    2 5    2 5    2 5    2 5    2 5  17 5  7/13/2017   2 5     2 22  2 5    2 5    2 5    2 5    2 5    2 5    2 5  17 5  6/15/2017   2 5     2 09  2 5    2 5    2 5    2 5    2 5    2 5    2 5  17 5  5/18/2017   2 5     1 82  2 5    2 5    2 5    2 5    2 5    2 5    2 5  17 5  ADDITIONAL PREVIOUS VISIT INFORMATION  VISITDATE   PRIMARY RX               DOSE      CrCl  8/10/2017   WARFARIN                 5mg  7/13/2017   WARFARIN                 5mg  6/15/2017   WARFARIN 5mg  5/18/2017   WARFARIN                 5mg  OTHER CURRENT MEDICATIONS:  WARFARIN  PROGRESS NOTES:   PATIENT INSTRUCTIONS: 8/10/17, tc pt, lm am cont current dose, rech 4  weeks, 9/7  shannan  TEST LOCATION: [place] [address], , ,   INBOUND LAB DATA:  Lab       Lab Value Col Date                 Rpt Date                 Lab  Reference Range  Electronically signed by:  Mahendra Mccord on 8/10/2017 4:19 PM EDT

## 2018-01-15 NOTE — RESULT NOTES
Verified Results  (1) PT WITH INR [id number] 08:28AM Jennifer Rajan     Test Name Result Flag Reference   INR 2 28 H 0 86-1 16   PT 24 8 seconds H 12 0-14 3

## 2018-01-15 NOTE — SOCIAL WORK
CM received a call from [facility] [place] in regards to a BLS transportation form that was needed for the 12/1/17 transport  CM filled one out citing unresolved cognitive decline as the reason for the BLS transport

## 2018-01-15 NOTE — RESULT NOTES
Verified Results  (1) PT WITH INR [id number] 08:19AM Dee Menon Order Number: [id number]     Test Name Result Flag Reference   INR 2 10 H 0 86-1 16   Performing Comments: PT/INR weekly or as directed by physician  Expires 6months   [id number]   PT 23 3 seconds H 12 0-14 3

## 2018-01-15 NOTE — RESULT NOTES
Verified Results  (1) PT WITH INR [id number] 08:37AM Sowmya Noble     Test Name Result Flag Reference   INR 2 65 H 0 86-1 16   PT 27 8 seconds H 12 0-14 3

## 2018-01-15 NOTE — PROGRESS NOTES
REPORT NAME: Patient Visit Summary Report   VISIT DATE: 12/30/2016  VISIT TIME: 2:34 PM EST  PATIENT NAME: Catalino Myers  MEDICAL RECORD NUMBER: [id number]  SOCIAL SECURITY NUMBER:   YOB: 1923  AGE: 80  REFERRING PHYSICIAN: Deena Martini MD  SUPERVISING CLINICIAN: Deena Martini MD  HEALTH CARE PROFESSIONAL: Fe Moreira  PATIENT HOME ADDRESS: [address] [facility] [address], [place] [address], [facility]  PATIENT HOME PHONE: [phone]  DIAGNOSIS 1: Unspecified atrial fibrillation / I48 91  DIAGNOSIS 2:   DIAGNOSIS 3:   DIAGNOSIS 4:   INR RANGE: 2 - 3  INR GOAL: 2 5  TREATMENT START DATE: 3/15/2012  TREATMENT END DATE:   NEXT VISIT:       VISIT RESULTS   ENCOUNTER NUMBER:   TEST LOCATION: [facility]  TEST TYPE: Outside Lab (Venipuncture)  VISIT TYPE:   CURRENT INR: 2 28 PROTIME:   SPECIMEN COL AND RPT DATE: 12/30/2016 2:34 PM  EST    VITAL SIGNS  PULSE:  B/P:  WEIGHT:  HEIGHT:  TEMP:     CURRENT ANTICOAGULANT DOSING SCHEDULE  DOSE SIZE: 5mg    ANTICOAGULANT TYPE: WARFARIN  DOSING REGIMEN  Sun       Mon Tues Wed Thurs Fri       Sat  Total/Wk  2 5       2 5       2 5       2 5       2 5       2 5       2 5       17 5    PATIENT MEDICATION INSTRUCTION: No  PATIENT NUTRITIONAL COUNSELING: No  PATIENT BRUISING INSTRUCTION: No      LAST EDUCATION DATE:       PREVIOUS VISIT INFORMATION  VISITDATE   INR Goal  INR   Sun     Mon Tues Wed Thurs Fri  Sat     Total/wk  12/30/2016  2 5       2 28  2 5     2 5     2 5     2 5     2 5     2 5  2 5     17 5  11/28/2016  2 5       2 39  2 5     2 5     2 5     2 5     2 5     2 5  2 5     17 5  10/25/2016  2 5       2 25  2 5     2 5     2 5     2 5     2 5     2 5  2 5     17 5  10/4/2016   2 5       2 42  2 5     2 5     2 5     2 5     2 5     2 5  2 5     17 5    ADDITIONAL PREVIOUS VISIT INFORMATION  VISITDATE   PRIMARY RX               DOSE      CrCl  12/30/2016  WARFARIN                 5mg                 11/28/2016 WARFARIN                 5mg                 10/25/2016  WARFARIN                 5mg                 10/4/2016   WARFARIN                 5mg                     OTHER CURRENT MEDICATIONS: WARFARIN      PROGRESS NOTES:     PATIENT INSTRUCTIONS: pt called same dose recheck in 4 weeks    TEST LOCATION: [facility]     Electronically signed by: Moises Souza on 12/30/2016 at 2:34 PM EST

## 2018-01-15 NOTE — PROGRESS NOTES
REPORT NAME: Patient Visit Summary Report   VISIT DATE: 10/4/2016  VISIT TIME: 4:21 PM EDT  PATIENT NAME: Eduardo Vences RECORD NUMBER: [id number]  SOCIAL SECURITY NUMBER:   YOB: 1923  AGE: 80  REFERRING PHYSICIAN: Keri Banegas MD  SUPERVISING CLINICIAN: Keri Banegas MD  HEALTH CARE PROFESSIONAL: Razia [facility]  PATIENT HOME ADDRESS: [address] [facility] [address] [address]  PATIENT HOME PHONE: [phone]  DIAGNOSIS 1: Unspecified atrial fibrillation / I48 91  DIAGNOSIS 2:   DIAGNOSIS 3:   DIAGNOSIS 4:   INR RANGE: 2 - 3  INR GOAL: 2 5  TREATMENT START DATE: 3/15/2012  TREATMENT END DATE:   NEXT VISIT: 10/25/2016  [facility]    VISIT RESULTS   ENCOUNTER NUMBER:   TEST LOCATION: [facility]  TEST TYPE: Outside Lab (Venipuncture)  VISIT TYPE: Phone Consult  CURRENT INR: 2 42 PROTIME: 26  SPECIMEN COL AND RPT DATE: 10/4/2016 4:21 PM  EDT    VITAL SIGNS  PULSE:  B/P:  WEIGHT:  HEIGHT:  TEMP:     CURRENT ANTICOAGULANT DOSING SCHEDULE  DOSE SIZE: 5mg    ANTICOAGULANT TYPE: WARFARIN  DOSING REGIMEN  Sun       Mon Tues Wed Thurs Fri       Sat  Total/Wk  2 5       2 5       2 5       2 5       2 5       2 5       2 5       17 5    PATIENT MEDICATION INSTRUCTION: Yes  PATIENT NUTRITIONAL COUNSELING: No  PATIENT BRUISING INSTRUCTION: Yes      LAST EDUCATION DATE:       PREVIOUS VISIT INFORMATION  VISITDATE   INR Goal  INR   Sun     Mon Tues Wed Thurs Fri  Sat     Total/wk  10/4/2016   2 5       2 42  2 5     2 5     2 5     2 5     2 5     2 5  2 5     17 5  9/20/2016   2 5       3 12  2 5     2 5     2 5     2 5     2 5     2 5  2 5     17 5  8/23/2016   2 5       2 65  2 5     2 5     2 5     5       2 5     2 5  2 5     20  7/26/2016   2 5       2 9   2 5     2 5     2 5     5       2 5     2 5  2 5     20    ADDITIONAL PREVIOUS VISIT INFORMATION  VISITDATE   PRIMARY RX               DOSE      CrCl  10/4/2016   WARFARIN 5mg                 9/20/2016   WARFARIN                 5mg                 8/23/2016   WARFARIN                 5mg                 7/26/2016   WARFARIN                 5mg                     OTHER CURRENT MEDICATIONS: WARFARIN      PROGRESS NOTES:     PATIENT INSTRUCTIONS: 10/4/16, tc pt, cont current dose, rech 3 weeks,  10/25  shannan  TEST LOCATION: [facility]    Electronically signed by:  Carole Mccord on 10/4/2016 at 4:21 PM EDT

## 2018-01-16 NOTE — PROGRESS NOTES
REPORT NAME: Patient Visit Summary Report   VISIT DATE: 2/10/2016  VISIT TIME: 10:46 AM EST  PATIENT NAME: Satya Clement  MEDICAL RECORD NUMBER: [id number]  SOCIAL SECURITY NUMBER:   YOB: 1923  AGE: 80  REFERRING PHYSICIAN: Kitty Staples MD  SUPERVISING CLINICIAN: Kitty Staples MD  HEALTH CARE PROFESSIONAL: Jason Downs  PATIENT HOME ADDRESS: [address] [facility] [address], [place] [address], [place] [address]  PATIENT HOME PHONE: [phone]  DIAGNOSIS 1: Unspecified atrial fibrillation / I48 91  DIAGNOSIS 2:   DIAGNOSIS 3:   DIAGNOSIS 4:   INR RANGE: 2 - 3  INR GOAL: 2 5  TREATMENT START DATE: 3/15/2012  TREATMENT END DATE:   NEXT VISIT: 3/9/2016  [facility]    VISIT RESULTS   ENCOUNTER NUMBER:   [facility]  TEST TYPE: Outside Lab (Venipuncture)  VISIT TYPE: Phone Consult  CURRENT INR: 2 88 PROTIME: 28 6  SPECIMEN COL AND RPT DATE: 2/10/2016 10:46  AM EST    VITAL SIGNS  PULSE:  B/P:  WEIGHT:  HEIGHT:  TEMP:     CURRENT ANTICOAGULANT DOSING SCHEDULE  DOSE SIZE: 5mg    ANTICOAGULANT TYPE: WARFARIN  DOSING REGIMEN  Sun       Mon Tues Wed Thurs Fri       Sat  Total/Wk  2 5       2 5       2 5       5         2 5       2 5       2 5       20    PATIENT MEDICATION INSTRUCTION: Yes  PATIENT NUTRITIONAL COUNSELING: No  PATIENT BRUISING INSTRUCTION: No      LAST EDUCATION DATE:       PREVIOUS VISIT INFORMATION  VISITDATE   INR Goal  INR   Sun     Mon Tues Wed Thurs Fri  Sat     Total/wk  2/10/2016   2 5       2 88  2 5     2 5     2 5     5       2 5     2 5  2 5     20  1/13/2016   2 5       2 41  2 5     2 5     2 5     5       2 5     2 5  2 5     20  12/16/2015  2 5       2 8   2 5     2 5     2 5     5       2 5     2 5  2 5     20  11/18/2015  2 5       2 41  2 5     2 5     2 5     5       2 5     2 5  2 5     20    ADDITIONAL PREVIOUS VISIT INFORMATION  VISITDATE   PRIMARY RX               DOSE      CrCl  2/10/2016   WARFARIN 5mg                 1/13/2016   WARFARIN                 5mg                 12/16/2015  WARFARIN                 5mg                 11/18/2015  WARFARIN                 5mg                     OTHER CURRENT MEDICATIONS: WARFARIN      PROGRESS NOTES:     PATIENT INSTRUCTIONS: 2/11/16, tc pt, cont current dose, rech 4 weeks, 3/9   shannan  TEST LOCATION: [place] [address]    Electronically signed by:  Linda Mccord on 2/11/2016 at 10:46 AM EST

## 2018-01-16 NOTE — PROGRESS NOTES
REPORT NAME: Patient Visit Summary Report   VISIT DATE: 1/26/2017  VISIT TIME: 4:02 PM EST  PATIENT NAME: Lui Forde  MEDICAL RECORD NUMBER: [id number]  SOCIAL SECURITY NUMBER:   YOB: 1923  AGE: 80  REFERRING PHYSICIAN: Peter Moarles MD  SUPERVISING CLINICIAN: Peter Morales MD  HEALTH CARE PROFESSIONAL: Cristiano Downs  PATIENT HOME ADDRESS: [address] [place] [address]  PATIENT HOME PHONE: [phone]  DIAGNOSIS 1: Unspecified atrial fibrillation / I48 91  DIAGNOSIS 2:   DIAGNOSIS 3:   DIAGNOSIS 4:   INR RANGE: 2 - 3  INR GOAL: 2 5  TREATMENT START DATE: 3/15/2012  TREATMENT END DATE:   NEXT VISIT: 2/23/2017  [facility]    VISIT RESULTS   ENCOUNTER NUMBER:   [facility]  TEST TYPE: Outside Lab (Venipuncture)  VISIT TYPE: Phone Consult  CURRENT INR: 2 55 PROTIME: 27  SPECIMEN COL AND RPT DATE: 1/26/2017 4:02 PM  EST    VITAL SIGNS  PULSE:  B/P:  WEIGHT:  HEIGHT:  TEMP:     CURRENT ANTICOAGULANT DOSING SCHEDULE  DOSE SIZE: 5mg    ANTICOAGULANT TYPE: WARFARIN  DOSING REGIMEN  Sun       Mon Tues Wed Thurs Fri       Sat  Total/Wk  2 5       2 5       2 5       2 5       2 5       2 5       2 5       17 5    PATIENT MEDICATION INSTRUCTION: Yes  PATIENT NUTRITIONAL COUNSELING: No  PATIENT BRUISING INSTRUCTION: No      LAST EDUCATION DATE:       PREVIOUS VISIT INFORMATION  VISITDATE   INR Goal  INR   Sun     Mon Tues Wed Thurs Fri  Sat     Total/wk  1/26/2017   2 5       2 55  2 5     2 5     2 5     2 5     2 5     2 5  2 5     17 5  12/30/2016  2 5       2 28  2 5     2 5     2 5     2 5     2 5     2 5  2 5     17 5  11/28/2016  2 5       2 39  2 5     2 5     2 5     2 5     2 5     2 5  2 5     17 5  10/25/2016  2 5       2 25  2 5     2 5     2 5     2 5     2 5     2 5  2 5     17 5    ADDITIONAL PREVIOUS VISIT INFORMATION  VISITDATE   PRIMARY RX               DOSE      CrCl  1/26/2017   WARFARIN 5mg                 12/30/2016  WARFARIN                 5mg                 11/28/2016  WARFARIN                 5mg                 10/25/2016  WARFARIN                 5mg                     OTHER CURRENT MEDICATIONS: WARFARIN      PROGRESS NOTES:     PATIENT INSTRUCTIONS: 1/26/17, tc pt, cont current dose, rech 4 weeks,  2/23  shannan  TEST LOCATION: [facility]    Electronically signed by:  Davida Phalen Perinotti on 1/26/2017 at 4:02 PM EST

## 2018-01-16 NOTE — PROGRESS NOTES
REPORT NAME: Patient Visit Summary Report   VISIT DATE: 7/26/2016  VISIT TIME: 11:08 AM EDT  PATIENT NAME: Eduardo Everett RECORD NUMBER: [id number]  SOCIAL SECURITY NUMBER:   YOB: 1923  AGE: 80  REFERRING PHYSICIAN: Riki Daniel MD  SUPERVISING CLINICIAN: Riki Daniel MD  HEALTH CARE PROFESSIONAL: Mira Downs  PATIENT HOME ADDRESS: [address] [place] [address]  PATIENT HOME PHONE: [phone]  DIAGNOSIS 1: Unspecified atrial fibrillation / I48 91  DIAGNOSIS 2:   DIAGNOSIS 3:   DIAGNOSIS 4:   INR RANGE: 2 - 3  INR GOAL: 2 5  TREATMENT START DATE: 3/15/2012  TREATMENT END DATE:   NEXT VISIT: 8/23/2016  [facility]    VISIT RESULTS   ENCOUNTER NUMBER:   TEST LOCATION: [facility]  TEST TYPE: Outside Lab (Venipuncture)  VISIT TYPE: Phone Consult  CURRENT INR: 2 9 PROTIME: 29 8  SPECIMEN COL AND RPT DATE: 7/26/2016 11:08  AM EDT    VITAL SIGNS  PULSE:  B/P:  WEIGHT:  HEIGHT:  TEMP:     CURRENT ANTICOAGULANT DOSING SCHEDULE  DOSE SIZE: 5mg    ANTICOAGULANT TYPE: WARFARIN  DOSING REGIMEN  Sun       Mon       Tues      Wed       Thurs     Fri       Sat  Total/Wk  2 5       2 5       2 5       5         2 5       2 5       2 5       20    PATIENT MEDICATION INSTRUCTION: Yes  PATIENT NUTRITIONAL COUNSELING: No  PATIENT BRUISING INSTRUCTION: Yes      LAST EDUCATION DATE:       PREVIOUS VISIT INFORMATION  VISITDATE   INR Goal  INR   Sun     Mon Tues Wed Thurs Fri  Sat     Total/wk  7/26/2016   2 5       2 9   2 5     2 5     2 5     5       2 5     2 5  2 5     20  6/29/2016   2 5       2 67  2 5     2 5     2 5     5       2 5     2 5  2 5     20  6/6/2016    2 5       2 89  2 5     2 5     2 5     5       2 5     2 5  2 5     20  5/16/2016   2 5       2 93  2 5     2 5     2 5     5       2 5     2 5  2 5     20    ADDITIONAL PREVIOUS VISIT INFORMATION  VISITDATE   PRIMARY RX               DOSE      CrCl  7/26/2016   WARFARIN 5mg                 6/29/2016   WARFARIN                 5mg                 6/6/2016    WARFARIN                 5mg                 5/16/2016   WARFARIN                 5mg                     OTHER CURRENT MEDICATIONS: WARFARIN      PROGRESS NOTES:     PATIENT INSTRUCTIONS: 7/27/16, tc pt, lm am, cont current dose, rech 4  week,8/23  shannan  TEST LOCATION: [facility]    Electronically signed by:  Filiberto Mccord on 7/27/2016 at 11:08 AM EDT

## 2018-01-16 NOTE — RESULT NOTES
Verified Results  (1) PT WITH INR [id number] 08:34AM Natalie Hodge     Test Name Result Flag Reference   INR 2 90 H 0 86-1 16   PT 29 8 seconds H 12 0-14 3

## 2018-01-16 NOTE — RESULT NOTES
Verified Results  (1) PT WITH INR [id number] 09:04AM Kathryn Hicks     Test Name Result Flag Reference   INR 2 39 H 0 86-1 16   PT 25 7 seconds H 12 0-14 3

## 2018-01-17 NOTE — PROGRESS NOTES
REPORT NAME: Patient Visit Summary Report   VISIT DATE: 10/25/2016  VISIT TIME: 4:21 PM EDT  PATIENT NAME: Eduardo Vences RECORD NUMBER: [id number]  SOCIAL SECURITY NUMBER:   YOB: 1923  AGE: 80  REFERRING PHYSICIAN: Keri Banegas MD  SUPERVISING CLINICIAN: Keri Banegas MD  HEALTH CARE PROFESSIONAL: Razia [facility]  PATIENT HOME ADDRESS: [address] [facility] [address] [address]  PATIENT HOME PHONE: [phone]  DIAGNOSIS 1: Unspecified atrial fibrillation / I48 91  DIAGNOSIS 2:   DIAGNOSIS 3:   DIAGNOSIS 4:   INR RANGE: 2 - 3  INR GOAL: 2 5  TREATMENT START DATE: 3/15/2012  TREATMENT END DATE:   NEXT VISIT: 11/15/2016  [facility]    VISIT RESULTS   ENCOUNTER NUMBER:   TEST LOCATION: [facility]  TEST TYPE: Outside Lab (Venipuncture)  VISIT TYPE: Phone Consult  CURRENT INR: 2 25 PROTIME: 24 6  SPECIMEN COL AND RPT DATE: 10/25/2016 4:21  PM EDT    VITAL SIGNS  PULSE:  B/P:  WEIGHT:  HEIGHT:  TEMP:     CURRENT ANTICOAGULANT DOSING SCHEDULE  DOSE SIZE: 5mg    ANTICOAGULANT TYPE: WARFARIN  DOSING REGIMEN  Sun       Mon Tues Wed Thurs Fri       Sat  Total/Wk  2 5       2 5       2 5       2 5       2 5       2 5       2 5       17 5    PATIENT MEDICATION INSTRUCTION: Yes  PATIENT NUTRITIONAL COUNSELING: No  PATIENT BRUISING INSTRUCTION: No      LAST EDUCATION DATE:       PREVIOUS VISIT INFORMATION  VISITDATE   INR Goal  INR   Sun     Mon Tues Wed Thurs Fri  Sat     Total/wk  10/25/2016  2 5       2 25  2 5     2 5     2 5     2 5     2 5     2 5  2 5     17 5  10/4/2016   2 5       2 42  2 5     2 5     2 5     2 5     2 5     2 5  2 5     17 5  9/20/2016   2 5       3 12  2 5     2 5     2 5     2 5     2 5     2 5  2 5     17 5  8/23/2016   2 5       2 65  2 5     2 5     2 5     5       2 5     2 5  2 5     20    ADDITIONAL PREVIOUS VISIT INFORMATION  VISITDATE   PRIMARY RX               DOSE      CrCl  10/25/2016  WARFARIN 5mg                 10/4/2016   WARFARIN                 5mg                 9/20/2016   WARFARIN                 5mg                 8/23/2016   WARFARIN                 5mg                     OTHER CURRENT MEDICATIONS: WARFARIN      PROGRESS NOTES:     PATIENT INSTRUCTIONS: 10/25/16, tc pt, lm am, cont current dose, rech 3  weeks, 11/15  shannan  TEST LOCATION: [facility]    Electronically signed by:  Sukhi Mccord on 10/25/2016 at 4:21 PM EDT

## 2018-01-17 NOTE — RESULT NOTES
Verified Results  (1) PT WITH INR [id number] 08:50AM Juanito Ba     Test Name Result Flag Reference   INR 2 42 H 0 86-1 16   PT 26 0 seconds H 12 0-14 3

## 2018-01-17 NOTE — PROGRESS NOTES
REPORT NAME: Patient Visit Summary Report   VISIT DATE: 4/19/2016  VISIT TIME: 10:28 AM EDT  PATIENT NAME: Eduardo Harrell RECORD NUMBER: [id number]  SOCIAL SECURITY NUMBER:   YOB: 1923  AGE: 80  REFERRING PHYSICIAN: Carlos Ponce MD  SUPERVISING CLINICIAN: Carlos Ponce MD  HEALTH CARE PROFESSIONAL: Hal Downs  PATIENT HOME ADDRESS: [address] [place] [address]  PATIENT HOME PHONE: [phone]  DIAGNOSIS 1: Unspecified atrial fibrillation / I48 91  DIAGNOSIS 2:   DIAGNOSIS 3:   DIAGNOSIS 4:   INR RANGE: 2 - 3  INR GOAL: 2 5  TREATMENT START DATE: 3/15/2012  TREATMENT END DATE:   NEXT VISIT: 5/17/2016  [facility]    VISIT RESULTS   ENCOUNTER NUMBER:   TEST LOCATION: [facility]  TEST TYPE: Outside Lab (Venipuncture)  VISIT TYPE: Phone Consult  CURRENT INR: 2 57 PROTIME: 26 3  SPECIMEN COL AND RPT DATE: 4/19/2016 10:28  AM EDT    VITAL SIGNS  PULSE:  B/P:  WEIGHT:  HEIGHT:  TEMP:     CURRENT ANTICOAGULANT DOSING SCHEDULE  DOSE SIZE: 5mg    ANTICOAGULANT TYPE: WARFARIN  DOSING REGIMEN  Sun       Mon       Tues      Wed       Thurs     Fri       Sat  Total/Wk  2 5       2 5       2 5       5         2 5       2 5       2 5       20    PATIENT MEDICATION INSTRUCTION: Yes  PATIENT NUTRITIONAL COUNSELING: No  PATIENT BRUISING INSTRUCTION: No      LAST EDUCATION DATE:       PREVIOUS VISIT INFORMATION  VISITDATE   INR Goal  INR   Sun     Mon Tues Wed Thurs Fri  Sat     Total/wk  4/19/2016   2 5       2 57  2 5     2 5     2 5     5       2 5     2 5  2 5     20  4/5/2016    2 5       1 79  2 5     2 5     2 5     5       2 5     2 5  2 5     20  3/8/2016    2 5       2 78  0       0       0       0       0       0  0       0  2/10/2016   2 5       2 88  2 5     2 5     2 5     5       2 5     2 5  2 5     20    ADDITIONAL PREVIOUS VISIT INFORMATION  VISITDATE   PRIMARY RX               DOSE      CrCl  4/19/2016   WARFARIN 5mg                 4/5/2016    WARFARIN                 5mg                 3/8/2016    WARFARIN                 5mg                 2/10/2016   WARFARIN                 5mg                     OTHER CURRENT MEDICATIONS: WARFARIN      PROGRESS NOTES:     PATIENT INSTRUCTIONS: 4/20/16, tc pt, lm am, cont current dose, rech 4  weeks, 5/17  shannan  TEST LOCATION: [facility]    Electronically signed by:  Misti Mccord on 4/20/2016 at 10:28 AM EDT

## 2018-01-18 NOTE — RESULT NOTES
Verified Results  (1) PT WITH INR [id number] 11:54AM Nanda Meza     Test Name Result Flag Reference   INR 3 12 H 0 86-1 16   PT 31 5 seconds H 12 0-14 3

## 2018-01-18 NOTE — RESULT NOTES
Verified Results  (1) PT WITH INR [id number] 09:12AM Naman Goldsmith     Test Name Result Flag Reference   INR 2 09 H 0 86-1 16   PT 23 7 seconds H 12 1-14 4

## 2018-01-18 NOTE — PROGRESS NOTES
REPORT NAME: Progress Notes Report  VISIT DATE: 9/7/2017  VISIT TIME: 4:17 PM EDT  PATIENT NAME: Lloyd Rodrigues  MEDICAL RECORD NUMBER: [id number]  YOB: 1923  AGE: 80  REFERRING PHYSICIAN: Virginia Post MD  SUPERVISING CLINICIAN: Virginia Post MD  HEALTH CARE PROVIDER: Davida Ph[address]  PATIENT HOME ADDRESS: [address] [address]  PATIENT HOME PHONE: [phone]  SOCIAL SECURITY NUMBER:   DIAGNOSIS 1: Unspecified atrial fibrillation / [id number]  DIAGNOSIS 2:   INR RANGE: 2 - 3  INR GOAL: 2 5  TREATMENT START DATE: 3/15/2012  TREATMENT END DATE:   NEXT VISIT: 10/5/2017  [facility]  VISIT RESULTS  ENCOUNTER NUMBER:   TEST LOCATION: [facility]  TEST TYPE: Outside Lab (Venipuncture)  VISIT TYPE: Phone Consult  CURRENT INR: 2 17 PROTIME: 24 4  SPECIMEN COL AND RPT DATE: 9/7/2017 4:17  PM EDT  VITAL SIGNS  PULSE:  BP: / WEIGHT:  HEIGHT:  TEMP:   CURRENT ANTICOAGULANT DOSING SCHEDULE  DOSE SIZE: 5mg    ANTICOAGULANT TYPE: WARFARIN  DOSING REGIMEN  Sun       Mon Tues Wed Thurs Fri       Sat  Total/Wk  2 5       2 5       2 5       2 5       2 5       2 5       2 5       17 5  PATIENT MEDICATION INSTRUCTION: Yes  PATIENT NUTRITIONAL COUNSELING: No  PATIENT BRUISING INSTRUCTION: Yes  LAST EDUCATION DATE:   PREVIOUS VISIT INFORMATION  VISITDATE  INRGoal INR   Sun    Mon Tues Wed Thurs Fri    Sat  Total/wk  9/7/2017    2 5     2 17  2 5    2 5    2 5    2 5    2 5    2 5    2 5  17 5  8/10/2017   2 5     2 08  2 5    2 5    2 5    2 5    2 5    2 5    2 5  17 5  7/13/2017   2 5     2 22  2 5    2 5    2 5    2 5    2 5    2 5    2 5  17 5  6/15/2017   2 5     2 09  2 5    2 5    2 5    2 5    2 5    2 5    2 5  17 5  ADDITIONAL PREVIOUS VISIT INFORMATION  VISITDATE   PRIMARY RX               DOSE      CrCl  9/7/2017    WARFARIN                 5mg  8/10/2017   WARFARIN                 5mg  7/13/2017   WARFARIN 5mg  6/15/2017   WARFARIN                 5mg  OTHER CURRENT MEDICATIONS:  WARFARIN  PROGRESS NOTES:   PATIENT INSTRUCTIONS: 9/7/17, tc pt, lm am, cont current dose, rech 4  weeks, 10/5  shannan  TEST LOCATION: [place] [id number], , ,   INBOUND LAB DATA:  Lab       Lab Value Col Date                 Rpt Date                 Lab  Reference Range  Electronically signed by:  Piyush Mccord on 9/7/2017 4:17 PM EDT

## 2018-01-19 ENCOUNTER — ANTICOAG VISIT (OUTPATIENT)
Dept: CARDIOLOGY CLINIC | Facility: CLINIC | Age: 83
End: 2018-01-19

## 2018-01-23 NOTE — PROGRESS NOTES
REPORT NAME: Progress Notes Report  VISIT DATE: 12/18/2017  VISIT TIME: 3:18 PM EST  PATIENT NAME: Nikolai Mario  MEDICAL RECORD NUMBER: [id number]  YOB: 1923  AGE: 80  REFERRING PHYSICIAN: Liliana Champagne MD  SUPERVISING CLINICIAN: iLliana Champagne MD  HEALTH CARE PROVIDER: Maricruz Downs  PATIENT HOME ADDRESS: [address] [address]  PATIENT HOME PHONE: [phone]  SOCIAL SECURITY NUMBER:   DIAGNOSIS 1: Unspecified atrial fibrillation / [id number]  DIAGNOSIS 2:   INR RANGE: 2 - 3  INR GOAL: 2 5  TREATMENT START DATE: 3/15/2012  TREATMENT END DATE:   NEXT VISIT: 12/26/2017  [facility]  VISIT RESULTS  ENCOUNTER NUMBER:   TEST LOCATION: [facility]  TEST TYPE: Outside Lab (Venipuncture)  VISIT TYPE:   CURRENT INR: 2 17 PROTIME: 24 4  SPECIMEN COL AND RPT DATE: 12/18/2017 3:18  PM EST  VITAL SIGNS  PULSE:  BP: / WEIGHT:  HEIGHT:  TEMP:   CURRENT ANTICOAGULANT DOSING SCHEDULE  DOSE SIZE: 5mg    ANTICOAGULANT TYPE: WARFARIN  DOSING REGIMEN  Sun       Mon Tues Wed Thurs Fri       Sat  Total/Wk  2 5       2 5       2 5       2 5       2 5       2 5       2 5       17 5  PATIENT MEDICATION INSTRUCTION: Yes  PATIENT NUTRITIONAL COUNSELING: No  PATIENT BRUISING INSTRUCTION: No  LAST EDUCATION DATE:   PREVIOUS VISIT INFORMATION  VISITDATE  INRGoal INR   Sun    Mon Tues Wed Thurs Fri    Sat  Total/wk  12/18/2017  2 5     2 17  2 5    2 5    2 5    2 5    2 5    2 5    2 5  17 5  10/10/2017  2 5     2 25  2 5    2 5    2 5    2 5    2 5    2 5    2 5  17 5  9/7/2017    2 5     2 17  2 5    2 5    2 5    2 5    2 5    2 5    2 5  17 5  8/10/2017   2 5     2 08  2 5    2 5    2 5    2 5    2 5    2 5    2 5  17 5  ADDITIONAL PREVIOUS VISIT INFORMATION  VISITDATE   PRIMARY RX               DOSE      CrCl  12/18/2017  WARFARIN                 5mg  10/10/2017  WARFARIN                 5mg  9/7/2017    WARFARIN                 5mg  8/10/2017 WARFARIN                 5mg  OTHER CURRENT MEDICATIONS:  WARFARIN  PROGRESS NOTES:   PATIENT INSTRUCTIONS: 12/18/17, tc [facility],spoke  to [employer]  cont current dose, 2 5 mg daily, rech 1 week, 12/26  will  fax to [facility], [phone]  shannan  TEST LOCATION: [place] [address], , ,   INBOUND LAB DATA:  Lab       Lab Value Col Date                 Rpt Date                 Lab  Reference Range  Electronically signed by:  Riya Mccord on 12/18/2017 3:18 PM EST

## 2018-01-23 NOTE — PROGRESS NOTES
REPORT NAME: Progress Notes Report  VISIT DATE: 12/26/2017  VISIT TIME: 4:19 PM EST  PATIENT NAME: Rah Villalobos  MEDICAL RECORD NUMBER: [id number]  YOB: 1923  AGE: 80  REFERRING PHYSICIAN: Antony Lesches, MD  SUPERVISING CLINICIAN: Antony Lesches, MD  HEALTH CARE PROVIDER: Сергей Downs  PATIENT HOME ADDRESS: [address] [address], [place] [address]  PATIENT HOME PHONE: [phone]  SOCIAL SECURITY NUMBER:   DIAGNOSIS 1: Unspecified atrial fibrillation / I48 [id number]  DIAGNOSIS 2:   INR RANGE: 2 - 3  INR GOAL: 2 5  TREATMENT START DATE: 3/15/2012  TREATMENT END DATE:   NEXT VISIT: 1/2/2018  [facility]  VISIT RESULTS  ENCOUNTER NUMBER:   TEST LOCATION: [facility]  TEST TYPE: Outside Lab (Venipuncture)  VISIT TYPE: Phone Consult  CURRENT INR: 2 5 PROTIME: 26 2  SPECIMEN COL AND RPT DATE: 12/26/2017 4:19  PM EST  VITAL SIGNS  PULSE:  BP: / WEIGHT:  HEIGHT:  TEMP:   CURRENT ANTICOAGULANT DOSING SCHEDULE  DOSE SIZE: 5mg    ANTICOAGULANT TYPE: WARFARIN  DOSING REGIMEN  Sun       Mon Tues Wed Thurs Fri       Sat  Total/Wk  2 5       2 5       2 5       2 5       2 5       2 5       2 5       17 5  PATIENT MEDICATION INSTRUCTION: Yes  PATIENT NUTRITIONAL COUNSELING: No  PATIENT BRUISING INSTRUCTION: No  LAST EDUCATION DATE:   PREVIOUS VISIT INFORMATION  VISITDATE  INRGoal INR   Sun    Mon Tues Wed Thurs Fri    Sat  Total/wk  12/26/2017  2 5     2 5   2 5    2 5    2 5    2 5    2 5    2 5    2 5  17 5  12/18/2017  2 5     2 17  2 5    2 5    2 5    2 5    2 5    2 5    2 5  17 5  10/10/2017  2 5     2 25  2 5    2 5    2 5    2 5    2 5    2 5    2 5  17 5  9/7/2017    2 5     2 17  2 5    2 5    2 5    2 5    2 5    2 5    2 5  17 5  ADDITIONAL PREVIOUS VISIT INFORMATION  VISITDATE   PRIMARY RX               DOSE      CrCl  12/26/2017  WARFARIN                 5mg  12/18/2017  WARFARIN                 5mg  10/10/2017  WARFARIN 5mg  9/7/2017    WARFARIN                 5mg  OTHER CURRENT MEDICATIONS:  WARFARIN  PROGRESS NOTES:   ---- Additional Notes entered on 1/4/2018 12:16 PM EST by Da Cooper 7 :  1/4/18, TC FROM PT Rena Solis, [phone]  IBNR NOT  DRAWN 1/2  CALLED TO [facility], WILL HAVE INR DONE ASAP  shannan  PATIENT INSTRUCTIONS: 12/26/17, tc pt, lm am, cont 2 5 mg daily, inr due 1  week, 1/2  will fax to home care  shannan  TEST LOCATION: [place] [address], , ,   INBOUND LAB DATA:  Lab       Lab Value Col Date                 Rpt Date                 Lab  Reference Range  Electronically signed by:  Da Mccord on 1/4/2018 12:16 PM EST

## 2018-01-29 ENCOUNTER — APPOINTMENT (OUTPATIENT)
Dept: LAB | Facility: CLINIC | Age: 83
End: 2018-01-29
Payer: MEDICARE

## 2018-01-29 ENCOUNTER — TRANSCRIBE ORDERS (OUTPATIENT)
Dept: LAB | Facility: CLINIC | Age: 83
End: 2018-01-29

## 2018-01-29 DIAGNOSIS — Z79.01 LONG-TERM (CURRENT) USE OF ANTICOAGULANTS: ICD-10-CM

## 2018-01-29 DIAGNOSIS — I48.91 ATRIAL FIBRILLATION, UNSPECIFIED TYPE (HCC): ICD-10-CM

## 2018-01-29 DIAGNOSIS — Z79.01 LONG-TERM (CURRENT) USE OF ANTICOAGULANTS: Primary | ICD-10-CM

## 2018-01-29 LAB
INR PPP: 1.99 (ref 0.86–1.16)
PROTHROMBIN TIME: 22.8 SECONDS (ref 12.1–14.4)

## 2018-01-29 PROCEDURE — 85610 PROTHROMBIN TIME: CPT

## 2018-01-29 PROCEDURE — 36415 COLL VENOUS BLD VENIPUNCTURE: CPT

## 2018-01-30 ENCOUNTER — ANTICOAG VISIT (OUTPATIENT)
Dept: CARDIOLOGY CLINIC | Facility: CLINIC | Age: 83
End: 2018-01-30

## 2018-02-10 PROBLEM — E78.2 MIXED HYPERLIPIDEMIA: Status: ACTIVE | Noted: 2018-02-10

## 2018-02-10 PROBLEM — N40.0 BENIGN PROSTATIC HYPERPLASIA WITHOUT LOWER URINARY TRACT SYMPTOMS: Status: ACTIVE | Noted: 2018-02-10

## 2018-02-12 ENCOUNTER — HOSPITAL ENCOUNTER (OUTPATIENT)
Dept: RADIOLOGY | Facility: HOSPITAL | Age: 83
Discharge: HOME | End: 2018-02-12
Attending: FAMILY MEDICINE
Payer: MEDICARE

## 2018-02-12 ENCOUNTER — TRANSCRIBE ORDERS (OUTPATIENT)
Dept: ADMINISTRATIVE | Facility: HOSPITAL | Age: 83
End: 2018-02-12

## 2018-02-12 DIAGNOSIS — R07.9 CHEST PAIN, UNSPECIFIED TYPE: Primary | ICD-10-CM

## 2018-02-12 DIAGNOSIS — R07.9 CHEST PAIN, UNSPECIFIED TYPE: ICD-10-CM

## 2018-02-12 PROCEDURE — 71101 X-RAY EXAM UNILAT RIBS/CHEST: CPT

## 2018-02-13 ENCOUNTER — OFFICE VISIT (OUTPATIENT)
Dept: CARDIOLOGY CLINIC | Facility: CLINIC | Age: 83
End: 2018-02-13
Payer: MEDICARE

## 2018-02-13 VITALS
DIASTOLIC BLOOD PRESSURE: 62 MMHG | WEIGHT: 175 LBS | BODY MASS INDEX: 28.12 KG/M2 | HEIGHT: 66 IN | HEART RATE: 76 BPM | SYSTOLIC BLOOD PRESSURE: 126 MMHG | RESPIRATION RATE: 16 BRPM

## 2018-02-13 DIAGNOSIS — E78.2 MIXED HYPERLIPIDEMIA: ICD-10-CM

## 2018-02-13 DIAGNOSIS — I48.21 PERMANENT ATRIAL FIBRILLATION (HCC): Primary | ICD-10-CM

## 2018-02-13 DIAGNOSIS — I49.5 SSS (SICK SINUS SYNDROME) (HCC): ICD-10-CM

## 2018-02-13 PROCEDURE — 99214 OFFICE O/P EST MOD 30 MIN: CPT | Performed by: INTERNAL MEDICINE

## 2018-02-13 RX ORDER — ASCORBIC ACID 500 MG
500 TABLET ORAL DAILY
COMMUNITY

## 2018-02-13 RX ORDER — MELATONIN
1000 DAILY
COMMUNITY

## 2018-02-13 NOTE — PROGRESS NOTES
Cardiology Follow Up    Sushila Goff  8/25/1923  [id number]  [address] [place] [id number]    Reason for visit: Chronic afib and HLP  Joseph Bass Also has SSS    1  Permanent atrial fibrillation ([facility] [facility] )     2  SSS (sick sinus syndrome) ([facility] [address] [facility] )     3  Mixed hyperlipidemia         Interval History:  The patient suffered falls in November  He was later admitted for encephalopathy  He was in rehab for awhile  He did have another fall over the weekend  He is having back pain from the fall  He denies chest pain  He does get some SALAS  He denies edema  He denies palpitations or dizziness  Patient Active Problem List   Diagnosis    Atrial fibrillation (HCC)    SSS (sick sinus syndrome) (HCC)    Closed traumatic displaced fracture of one rib of left side    Frequent falls    Cognitive decline    Mixed hyperlipidemia    Benign prostatic hyperplasia without lower urinary tract symptoms     Past Medical History:   Diagnosis Date    [facility]     Atrial fibrillation (HCC)     Hyperlipidemia      Social History     Social History    Marital status: /Civil Union     Spouse name: N/A    Number of children: N/A    Years of education: N/A     Occupational History    Not on file  Social History Main Topics    Smoking status: Former Smoker    Smokeless tobacco: Never Used    Alcohol use No    Drug use: No    Sexual activity: Not on file     Other Topics Concern    Not on file     Social History Narrative    No narrative on file      Family History   Problem Relation Age of Onset    No Known Problems Family      No past surgical history on file      Current Outpatient Prescriptions:     acetaminophen (TYLENOL) 325 mg tablet, Take 2 tablets by mouth every 4 (four) hours as needed for mild pain, headaches or fever, Disp: 30 tablet, Rfl: 0    ascorbic acid (VITAMIN C) 500 mg tablet, Take 500 mg by mouth daily, Disp: , Rfl:     cholecalciferol (VITAMIN D3) 1,000 units tablet, Take 1,000 Units by mouth daily, Disp: , Rfl:     ezetimibe-simvastatin (VYTORIN) 10-20 mg per tablet, Take by mouth, Disp: , Rfl:     lidocaine (LIDODERM) 5 %, Place 1 patch on the skin daily Remove & Discard patch within 12 hours or as directed by MD, Disp: 30 patch, Rfl: 0    Multiple Vitamins-Minerals (MULTIVITAMIN ADULT PO), Take by mouth daily, Disp: , Rfl:     warfarin (COUMADIN) 5 mg tablet, Take 5 mg by mouth daily, Disp: , Rfl:     docusate sodium (COLACE) 100 mg capsule, Take 1 capsule by mouth 2 (two) times a day, Disp: 10 capsule, Rfl: 0    Multiple Vitamin-Folic Acid TABS, Take 1 tablet by mouth daily, Disp: , Rfl:   No Known Allergies        Review of Systems:  Review of Systems   Constitutional: Positive for activity change and fatigue  Negative for appetite change and unexpected weight change  Respiratory: Positive for shortness of breath  Negative for cough, chest tightness and wheezing  Cardiovascular: Negative for chest pain, palpitations and leg swelling  Gastrointestinal: Negative for blood in stool, constipation, diarrhea and nausea  Genitourinary: Positive for frequency  Negative for difficulty urinating, hematuria and urgency  Musculoskeletal: Positive for arthralgias, back pain and gait problem  Negative for joint swelling  Neurological: Negative for dizziness, speech difficulty, light-headedness and headaches  Psychiatric/Behavioral: Negative for agitation, behavioral problems, confusion and decreased concentration  Physical Exam:  Vitals:    02/13/18 0850   BP: 126/62   BP Location: Left arm   Patient Position: Sitting   Cuff Size: Standard   Pulse: 76   Resp: 16   Weight: 79 4 kg (175 lb)   Height: 5' 6" (1 676 m)     Physical Exam   Constitutional: He is oriented to person, place, and time  He appears well-developed and well-nourished  No distress     In WC   HENT:   Head: Normocephalic and atraumatic  Mouth/Throat: No oropharyngeal exudate  Eyes: Conjunctivae are normal  No scleral icterus  Neck: Neck supple  Normal carotid pulses and no JVD present  Carotid bruit is not present  No thyromegaly present  Cardiovascular: Normal rate and intact distal pulses  An irregularly irregular rhythm present  Exam reveals no gallop and no friction rub  No murmur heard  Pulmonary/Chest: He has no wheezes  He has no rhonchi  He has rales in the right lower field and the left lower field  Abdominal: Soft  He exhibits no mass  There is no hepatosplenomegaly  There is no tenderness  Musculoskeletal: He exhibits tenderness (back due to fall) and deformity (kyphosis)  Neurological: He is alert and oriented to person, place, and time  He has normal strength  No cranial nerve deficit or sensory deficit  Skin: Skin is dry  No rash noted  No erythema  No pallor  Psychiatric: He has a normal mood and affect  His behavior is normal  Judgment and thought content normal               Discussion/Summary:  1  Established atrial fib ablation  Rate well controlled without specific rate control medications  Continue warfarin  Will continue to monitor falls and evaluate the safety of warfarin but since he received physical therapy falls have been less frequent  2  Sick sinus syndrome  No evidence for bradycardia arrhythmias  Falls appear to be unrelated to bradycardia  3  Mixed hyperlipidemia  Lipids generally very favorable on combination of ezetimibe and simvastatin  Continue same      FU one year      Miranda Campbell MD

## 2018-03-06 ENCOUNTER — ANTICOAG VISIT (OUTPATIENT)
Dept: CARDIOLOGY CLINIC | Facility: CLINIC | Age: 83
End: 2018-03-06

## 2018-03-06 ENCOUNTER — TRANSCRIBE ORDERS (OUTPATIENT)
Dept: LAB | Facility: CLINIC | Age: 83
End: 2018-03-06

## 2018-03-06 ENCOUNTER — APPOINTMENT (OUTPATIENT)
Dept: LAB | Facility: CLINIC | Age: 83
End: 2018-03-06
Payer: MEDICARE

## 2018-03-06 DIAGNOSIS — Z79.01 LONG-TERM (CURRENT) USE OF ANTICOAGULANTS: Primary | ICD-10-CM

## 2018-03-06 DIAGNOSIS — I48.91 ATRIAL FIBRILLATION, UNSPECIFIED TYPE (HCC): ICD-10-CM

## 2018-03-06 DIAGNOSIS — Z79.01 LONG-TERM (CURRENT) USE OF ANTICOAGULANTS: ICD-10-CM

## 2018-03-06 LAB
INR PPP: 2.6 (ref 0.86–1.16)
PROTHROMBIN TIME: 28.2 SECONDS (ref 12.1–14.4)

## 2018-03-06 PROCEDURE — 36415 COLL VENOUS BLD VENIPUNCTURE: CPT

## 2018-03-06 PROCEDURE — 85610 PROTHROMBIN TIME: CPT

## 2018-04-02 ENCOUNTER — ANTICOAG VISIT (OUTPATIENT)
Dept: CARDIOLOGY CLINIC | Facility: CLINIC | Age: 83
End: 2018-04-02

## 2018-04-02 ENCOUNTER — APPOINTMENT (OUTPATIENT)
Dept: LAB | Facility: CLINIC | Age: 83
End: 2018-04-02
Payer: MEDICARE

## 2018-04-02 DIAGNOSIS — Z79.01 LONG-TERM (CURRENT) USE OF ANTICOAGULANTS: ICD-10-CM

## 2018-04-02 DIAGNOSIS — I48.91 ATRIAL FIBRILLATION, UNSPECIFIED TYPE (HCC): ICD-10-CM

## 2018-04-02 LAB
INR PPP: 1.96 (ref 0.86–1.16)
PROTHROMBIN TIME: 22.5 SECONDS (ref 12.1–14.4)

## 2018-04-02 PROCEDURE — 36415 COLL VENOUS BLD VENIPUNCTURE: CPT

## 2018-04-02 PROCEDURE — 85610 PROTHROMBIN TIME: CPT

## 2018-04-16 ENCOUNTER — ANTICOAG VISIT (OUTPATIENT)
Dept: CARDIOLOGY CLINIC | Facility: CLINIC | Age: 83
End: 2018-04-16

## 2018-04-16 ENCOUNTER — TRANSCRIBE ORDERS (OUTPATIENT)
Dept: LAB | Facility: CLINIC | Age: 83
End: 2018-04-16

## 2018-04-16 ENCOUNTER — APPOINTMENT (OUTPATIENT)
Dept: LAB | Facility: CLINIC | Age: 83
End: 2018-04-16
Payer: MEDICARE

## 2018-04-16 DIAGNOSIS — Z79.01 LONG TERM (CURRENT) USE OF ANTICOAGULANTS: ICD-10-CM

## 2018-04-16 DIAGNOSIS — I48.91 ATRIAL FIBRILLATION, UNSPECIFIED TYPE (HCC): ICD-10-CM

## 2018-04-16 DIAGNOSIS — I48.91 ATRIAL FIBRILLATION, UNSPECIFIED TYPE (HCC): Primary | ICD-10-CM

## 2018-04-16 LAB
INR PPP: 1.83 (ref 0.86–1.16)
PROTHROMBIN TIME: 21.3 SECONDS (ref 12.1–14.4)

## 2018-04-16 PROCEDURE — 36415 COLL VENOUS BLD VENIPUNCTURE: CPT

## 2018-04-16 PROCEDURE — 85610 PROTHROMBIN TIME: CPT

## 2018-04-20 DIAGNOSIS — I48.91 ATRIAL FIBRILLATION, UNSPECIFIED TYPE (HCC): Primary | ICD-10-CM

## 2018-04-20 NOTE — TELEPHONE ENCOUNTER
Phone call from daughter Luis Cat    Requesting refill for Warfarin 5mg to [facility]

## 2018-04-21 RX ORDER — WARFARIN SODIUM 5 MG/1
TABLET ORAL
Qty: 30 TABLET | Refills: 4 | Status: SHIPPED | OUTPATIENT
Start: 2018-04-21 | End: 2019-01-21 | Stop reason: ALTCHOICE

## 2018-04-30 ENCOUNTER — APPOINTMENT (OUTPATIENT)
Dept: LAB | Facility: CLINIC | Age: 83
End: 2018-04-30
Payer: MEDICARE

## 2018-04-30 ENCOUNTER — ANTICOAG VISIT (OUTPATIENT)
Dept: CARDIOLOGY CLINIC | Facility: CLINIC | Age: 83
End: 2018-04-30

## 2018-04-30 LAB
INR PPP: 1.84 (ref 0.86–1.16)
PROTHROMBIN TIME: 21.4 SECONDS (ref 12.1–14.4)

## 2018-04-30 PROCEDURE — 85610 PROTHROMBIN TIME: CPT

## 2018-04-30 PROCEDURE — 36415 COLL VENOUS BLD VENIPUNCTURE: CPT

## 2018-05-14 ENCOUNTER — ANTICOAG VISIT (OUTPATIENT)
Dept: CARDIOLOGY CLINIC | Facility: CLINIC | Age: 83
End: 2018-05-14

## 2018-05-14 ENCOUNTER — APPOINTMENT (OUTPATIENT)
Dept: LAB | Facility: CLINIC | Age: 83
End: 2018-05-14
Payer: MEDICARE

## 2018-05-14 DIAGNOSIS — Z79.01 LONG TERM (CURRENT) USE OF ANTICOAGULANTS: ICD-10-CM

## 2018-05-14 DIAGNOSIS — I48.91 ATRIAL FIBRILLATION, UNSPECIFIED TYPE (HCC): Primary | ICD-10-CM

## 2018-05-14 LAB
INR PPP: 3.14 (ref 0.86–1.16)
PROTHROMBIN TIME: 32.7 SECONDS (ref 12.1–14.4)

## 2018-05-14 PROCEDURE — 36415 COLL VENOUS BLD VENIPUNCTURE: CPT

## 2018-05-14 PROCEDURE — 85610 PROTHROMBIN TIME: CPT

## 2018-05-24 ENCOUNTER — TRANSCRIBE ORDERS (OUTPATIENT)
Dept: LAB | Facility: CLINIC | Age: 83
End: 2018-05-24

## 2018-05-24 ENCOUNTER — ANTICOAG VISIT (OUTPATIENT)
Dept: CARDIOLOGY CLINIC | Facility: CLINIC | Age: 83
End: 2018-05-24

## 2018-05-24 ENCOUNTER — APPOINTMENT (OUTPATIENT)
Dept: LAB | Facility: CLINIC | Age: 83
End: 2018-05-24
Payer: MEDICARE

## 2018-05-24 DIAGNOSIS — I48.91 ATRIAL FIBRILLATION, UNSPECIFIED TYPE (HCC): ICD-10-CM

## 2018-05-24 DIAGNOSIS — Z79.01 LONG TERM (CURRENT) USE OF ANTICOAGULANTS: Primary | ICD-10-CM

## 2018-05-24 DIAGNOSIS — Z79.01 LONG TERM (CURRENT) USE OF ANTICOAGULANTS: ICD-10-CM

## 2018-05-24 LAB
INR PPP: 2.62 (ref 0.86–1.17)
PROTHROMBIN TIME: 28.1 SECONDS (ref 11.8–14.2)

## 2018-05-24 PROCEDURE — 85610 PROTHROMBIN TIME: CPT

## 2018-05-24 PROCEDURE — 36415 COLL VENOUS BLD VENIPUNCTURE: CPT

## 2018-06-06 ENCOUNTER — OFFICE VISIT (OUTPATIENT)
Dept: UROLOGY | Facility: MEDICAL CENTER | Age: 83
End: 2018-06-06
Payer: MEDICARE

## 2018-06-06 VITALS
DIASTOLIC BLOOD PRESSURE: 64 MMHG | WEIGHT: 164 LBS | SYSTOLIC BLOOD PRESSURE: 122 MMHG | BODY MASS INDEX: 26.36 KG/M2 | HEIGHT: 66 IN

## 2018-06-06 DIAGNOSIS — R35.0 URINARY FREQUENCY: Primary | ICD-10-CM

## 2018-06-06 DIAGNOSIS — N40.0 BENIGN PROSTATIC HYPERPLASIA WITHOUT LOWER URINARY TRACT SYMPTOMS: ICD-10-CM

## 2018-06-06 PROBLEM — E78.5 HYPERLIPIDEMIA: Status: ACTIVE | Noted: 2018-02-10

## 2018-06-06 LAB
POST-VOID RESIDUAL VOLUME, ML POC: 63 ML
SL AMB  POCT GLUCOSE, UA: NORMAL
SL AMB LEUKOCYTE ESTERASE,UA: NORMAL
SL AMB POCT BILIRUBIN,UA: NORMAL
SL AMB POCT BLOOD,UA: NORMAL
SL AMB POCT CLARITY,UA: CLEAR
SL AMB POCT COLOR,UA: YELLOW
SL AMB POCT KETONES,UA: NORMAL
SL AMB POCT NITRITE,UA: NORMAL
SL AMB POCT PH,UA: 7
SL AMB POCT SPECIFIC GRAVITY,UA: 1.01
SL AMB POCT URINE PROTEIN: NORMAL
SL AMB POCT UROBILINOGEN: 0.2

## 2018-06-06 PROCEDURE — 81003 URINALYSIS AUTO W/O SCOPE: CPT | Performed by: UROLOGY

## 2018-06-06 PROCEDURE — 99204 OFFICE O/P NEW MOD 45 MIN: CPT | Performed by: UROLOGY

## 2018-06-06 PROCEDURE — 51798 US URINE CAPACITY MEASURE: CPT | Performed by: UROLOGY

## 2018-06-06 RX ORDER — TAMSULOSIN HYDROCHLORIDE 0.4 MG/1
0.4 CAPSULE ORAL
Qty: 90 CAPSULE | Refills: 3 | Status: SHIPPED | OUTPATIENT
Start: 2018-06-06 | End: 2019-01-21 | Stop reason: ALTCHOICE

## 2018-06-06 NOTE — PROGRESS NOTES
Assessment/Plan:    Benign prostatic hyperplasia without lower urinary tract symptoms  AUA symptom score is 28  Postvoid residual is 63 cc  Urinalysis is negative  Options for treatment were discussed with the patient  I recommended a trial of tamsulosin  Use and side effects were discussed  If his nocturia does not improve I had discussed the performance of a voiding diary with the patient and his daughter  He will return in 6 weeks  Diagnoses and all orders for this visit:    Urinary frequency  -     POCT urine dip auto non-scope  -     POCT Measure PVR    Benign prostatic hyperplasia without lower urinary tract symptoms  -     tamsulosin (FLOMAX) 0 4 mg; Take 1 capsule (0 4 mg total) by mouth daily with dinner          Subjective:      Patient ID: Michaela Babcock is a 80 y o  male  51-year-old male with greater than 2 year history of difficulty voiding  His symptoms have worsened lately  He reports he voids with a weak intermittent stream   He feels that he does not empty well  He is getting up 5 times at night to urinate  He has urgency and occasional urge incontinence  He denies gross hematuria, dysuria or history of urinary tract infection  He has not had any previous urologic surgery  While he has seen a urologist in the past he does not recall being on any medication  The following portions of the patient's history were reviewed and updated as appropriate: allergies, current medications, past family history, past medical history, past social history, past surgical history and problem list     Review of Systems   Constitutional: Negative for chills, diaphoresis, fatigue and fever  HENT: Negative  Eyes: Negative  Respiratory: Negative  Cardiovascular: Negative  Endocrine: Negative  Musculoskeletal: Negative  Skin: Negative  Allergic/Immunologic: Negative  Neurological: Negative  Hematological: Negative  Psychiatric/Behavioral: Negative  Objective:      /64 (BP Location: Left arm, Patient Position: Sitting)   Ht 5' 6" (1 676 m)   Wt 74 4 kg (164 lb)   BMI 26 47 kg/m²          Physical Exam   Constitutional: He is oriented to person, place, and time  He appears well-developed and well-nourished  HENT:   Head: Normocephalic and atraumatic  Eyes: Conjunctivae are normal    Neck: Neck supple  Cardiovascular: Normal rate  Pulmonary/Chest: Effort normal    Abdominal: Soft  Bowel sounds are normal  He exhibits no distension and no mass  There is no tenderness  There is no rebound, no guarding and no CVA tenderness  Hernia confirmed negative in the right inguinal area and confirmed negative in the left inguinal area  Genitourinary: Rectum normal, testes normal and penis normal  Prostate is enlarged  Prostate is not tender  Right testis shows no mass  Left testis shows no mass  No phimosis or hypospadias  Genitourinary Comments: Prostate 1+ enlarged and palpably benign   Musculoskeletal: He exhibits no edema  Neurological: He is alert and oriented to person, place, and time  Skin: Skin is warm and dry  Psychiatric: He has a normal mood and affect  His behavior is normal  Judgment and thought content normal    Nursing note and vitals reviewed

## 2018-06-06 NOTE — PROGRESS NOTES
IPSS Questionnaire (AUA-7): Over the past month    1)  How often have you had a sensation of not emptying your bladder completely after you finish urinating? 5 - Almost always   2)  How often have you had to urinate again less than two hours after you finished urinating? 5 - Almost always   3)  How often have you found you stopped and started again several times when you urinated? 5 - Almost always   4) How difficult have you found it to postpone urination? 5 - Almost always   5) How often have you had a weak urinary stream?  3 - About half the time   6) How often have you had to push or strain to begin urination? 0 - Not at all   7) How many times did you most typically get up to urinate from the time you went to bed until the time you got up in the morning?   5 - 5+ times   Total Score:  28

## 2018-06-06 NOTE — LETTER
June 6, 2018     Wang Anaya MD  [facility]  [address]    Patient: Aneta Perez   YOB: 1923   Date of Visit: 6/6/2018       Dear Dr Mason Luisa:    Thank you for referring [facility] to me for evaluation  Below are my notes for this consultation  If you have questions, please do not hesitate to call me  I look forward to following your patient along with you           Sincerely,        Scottjodie Cha MD        CC: No Recipients

## 2018-06-06 NOTE — ASSESSMENT & PLAN NOTE
AUA symptom score is 28  Postvoid residual is 63 cc  Urinalysis is negative  Options for treatment were discussed with the patient  I recommended a trial of tamsulosin  Use and side effects were discussed  If his nocturia does not improve I had discussed the performance of a voiding diary with the patient and his daughter  He will return in 6 weeks

## 2018-06-06 NOTE — PATIENT INSTRUCTIONS
Tamsulosin (By mouth)   Tamsulosin Hydrochloride (any-QPZ-hko-sin walt-droe-KLOR-vasiliy)  Treats benign prostatic hyperplasia (enlarged prostate)  Brand Name(s): Flomax   There may be other brand names for this medicine  When This Medicine Should Not Be Used: This medicine is not right for everyone  Do not use it if you had an allergic reaction to tamsulosin  How to Use This Medicine:   Capsule  · Take your medicine as directed  Your dose may need to be changed several times to find what works best for you  · Take this medicine 30 minutes after the same meal each day  Swallow the capsule whole  Do not crush, chew, or open it  · Read and follow the patient instructions that come with this medicine  Talk to your doctor or pharmacist if you have any questions  · Missed dose: Take a dose as soon as you remember  If it is almost time for your next dose, wait until then and take a regular dose  Do not take extra medicine to make up for a missed dose  If you forget to take this medicine for several days in a row, talk to your doctor before you start taking it again  · Store the medicine in a closed container at room temperature, away from heat, moisture, and direct light  Drugs and Foods to Avoid:   Ask your doctor or pharmacist before using any other medicine, including over-the-counter medicines, vitamins, and herbal products  · Some medicines can affect how tamsulosin works  Tell your doctor if you are using another alpha blocker medicine, cimetidine, erythromycin, ketoconazole, paroxetine, terbinafine, warfarin, or medicine for erectile dysfunction  Warnings While Using This Medicine:   · Tell your doctor if you have kidney disease, liver disease, low blood pressure, prostate cancer, or an allergy to sulfa drugs  · Tell your doctor if you plan to have cataract or glaucoma surgery  This medicine may cause eye problems during surgery  · This medicine may make you dizzy or lightheaded   Do not drive or do anything else that could be dangerous until you know how this medicine affects you  Stand or sit up slowly if you feel dizzy  · Your doctor will check your progress and the effects of this medicine at regular visits  Keep all appointments  · Keep all medicine out of the reach of children  Never share your medicine with anyone  Possible Side Effects While Using This Medicine:   Call your doctor right away if you notice any of these side effects:  · Allergic reaction: Itching or hives, swelling in your face or hands, swelling or tingling in your mouth or throat, chest tightness, trouble breathing  · Blistering, peeling, red skin rash  · Lightheadedness, dizziness, fainting  · Painful, prolonged erection of your penis  If you notice these less serious side effects, talk with your doctor:   · Headache  · Problems with ejaculation  · Runny or stuffy nose  If you notice other side effects that you think are caused by this medicine, tell your doctor  Call your doctor for medical advice about side effects  You may report side effects to FDA at 6-667-FDA-6841  © 2017 [address] [employer] Information is for End User's use only and may not be sold, redistributed or otherwise used for commercial purposes  The above information is an  only  It is not intended as medical advice for individual conditions or treatments  Talk to your doctor, nurse or pharmacist before following any medical regimen to see if it is safe and effective for you

## 2018-06-07 ENCOUNTER — ANTICOAG VISIT (OUTPATIENT)
Dept: CARDIOLOGY CLINIC | Facility: CLINIC | Age: 83
End: 2018-06-07

## 2018-06-07 ENCOUNTER — APPOINTMENT (OUTPATIENT)
Dept: LAB | Facility: CLINIC | Age: 83
End: 2018-06-07
Payer: MEDICARE

## 2018-06-07 DIAGNOSIS — Z79.01 LONG TERM (CURRENT) USE OF ANTICOAGULANTS: Primary | ICD-10-CM

## 2018-06-07 DIAGNOSIS — I48.91 ATRIAL FIBRILLATION, UNSPECIFIED TYPE (HCC): ICD-10-CM

## 2018-06-07 LAB
INR PPP: 1.91 (ref 0.86–1.17)
PROTHROMBIN TIME: 22 SECONDS (ref 11.8–14.2)

## 2018-06-07 PROCEDURE — 36415 COLL VENOUS BLD VENIPUNCTURE: CPT

## 2018-06-07 PROCEDURE — 85610 PROTHROMBIN TIME: CPT

## 2018-06-21 ENCOUNTER — APPOINTMENT (OUTPATIENT)
Dept: LAB | Facility: CLINIC | Age: 83
End: 2018-06-21
Payer: MEDICARE

## 2018-06-21 ENCOUNTER — ANTICOAG VISIT (OUTPATIENT)
Dept: CARDIOLOGY CLINIC | Facility: CLINIC | Age: 83
End: 2018-06-21

## 2018-06-21 DIAGNOSIS — I48.91 ATRIAL FIBRILLATION, UNSPECIFIED TYPE (HCC): ICD-10-CM

## 2018-06-21 DIAGNOSIS — Z79.01 LONG TERM (CURRENT) USE OF ANTICOAGULANTS: Primary | ICD-10-CM

## 2018-06-21 LAB
INR PPP: 2.48 (ref 0.86–1.17)
PROTHROMBIN TIME: 26.9 SECONDS (ref 11.8–14.2)

## 2018-06-21 PROCEDURE — 36415 COLL VENOUS BLD VENIPUNCTURE: CPT

## 2018-06-21 PROCEDURE — 85610 PROTHROMBIN TIME: CPT

## 2018-06-29 ENCOUNTER — APPOINTMENT (EMERGENCY)
Dept: CT IMAGING | Facility: HOSPITAL | Age: 83
End: 2018-06-29
Payer: MEDICARE

## 2018-06-29 ENCOUNTER — HOSPITAL ENCOUNTER (EMERGENCY)
Facility: HOSPITAL | Age: 83
Discharge: HOME | End: 2018-06-29
Attending: EMERGENCY MEDICINE | Admitting: EMERGENCY MEDICINE
Payer: MEDICARE

## 2018-06-29 VITALS
DIASTOLIC BLOOD PRESSURE: 74 MMHG | SYSTOLIC BLOOD PRESSURE: 140 MMHG | OXYGEN SATURATION: 95 % | WEIGHT: 187.17 LBS | TEMPERATURE: 98.5 F | HEART RATE: 72 BPM | RESPIRATION RATE: 20 BRPM | BODY MASS INDEX: 30.21 KG/M2
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
## 2018-06-29 DIAGNOSIS — S01.91XA LACERATION OF HEAD: Primary | ICD-10-CM

## 2018-06-29 LAB
ANION GAP SERPL CALCULATED.3IONS-SCNC: 8 MMOL/L (ref 4–13)
APTT PPP: 55 SECONDS (ref 24–36)
BASOPHILS # BLD AUTO: 0.03 THOUSANDS/ΜL (ref 0–0.1)
BASOPHILS NFR BLD AUTO: 0 % (ref 0–1)
BUN SERPL-MCNC: 17 MG/DL (ref 5–25)
CALCIUM SERPL-MCNC: 9.2 MG/DL (ref 8.3–10.1)
CHLORIDE SERPL-SCNC: 102 MMOL/L (ref 100–108)
CO2 SERPL-SCNC: 29 MMOL/L (ref 21–32)
CREAT SERPL-MCNC: 1.22 MG/DL (ref 0.6–1.3)
EOSINOPHIL # BLD AUTO: 0.33 THOUSAND/ΜL (ref 0–0.61)
EOSINOPHIL NFR BLD AUTO: 4 % (ref 0–6)
ERYTHROCYTE [DISTWIDTH] IN BLOOD BY AUTOMATED COUNT: 14.6 % (ref 11.6–15.1)
ERYTHROCYTE [DISTWIDTH] IN BLOOD BY AUTOMATED COUNT: 14.6 % (ref 11.6–15.1)
GFR SERPL CREATININE-BSD FRML MDRD: 50 ML/MIN/1.73SQ M
GLUCOSE SERPL-MCNC: 170 MG/DL (ref 65–140)
HCT VFR BLD AUTO: 36.1 % (ref 36.5–49.3)
HCT VFR BLD AUTO: 42.6 % (ref 36.5–49.3)
HGB BLD-MCNC: 12.3 G/DL (ref 12–17)
HGB BLD-MCNC: 14.3 G/DL (ref 12–17)
INR PPP: 2.37 (ref 0.86–1.17)
LYMPHOCYTES # BLD AUTO: 3.38 THOUSANDS/ΜL (ref 0.6–4.47)
LYMPHOCYTES NFR BLD AUTO: 42 % (ref 14–44)
MCH RBC QN AUTO: 32.1 PG (ref 26.8–34.3)
MCH RBC QN AUTO: 32.4 PG (ref 26.8–34.3)
MCHC RBC AUTO-ENTMCNC: 33.6 G/DL (ref 31.4–37.4)
MCHC RBC AUTO-ENTMCNC: 34.1 G/DL (ref 31.4–37.4)
MCV RBC AUTO: 95 FL (ref 82–98)
MCV RBC AUTO: 96 FL (ref 82–98)
MONOCYTES # BLD AUTO: 0.45 THOUSAND/ΜL (ref 0.17–1.22)
MONOCYTES NFR BLD AUTO: 6 % (ref 4–12)
NEUTROPHILS # BLD AUTO: 3.9 THOUSANDS/ΜL (ref 1.85–7.62)
NEUTS SEG NFR BLD AUTO: 48 % (ref 43–75)
NRBC BLD AUTO-RTO: 0 /100 WBCS
PLATELET # BLD AUTO: 156 THOUSANDS/UL (ref 149–390)
PLATELET # BLD AUTO: 166 THOUSANDS/UL (ref 149–390)
PMV BLD AUTO: 9.3 FL (ref 8.9–12.7)
PMV BLD AUTO: 9.4 FL (ref 8.9–12.7)
POTASSIUM SERPL-SCNC: 4.5 MMOL/L (ref 3.5–5.3)
PROTHROMBIN TIME: 26 SECONDS (ref 11.8–14.2)
RBC # BLD AUTO: 3.8 MILLION/UL (ref 3.88–5.62)
RBC # BLD AUTO: 4.46 MILLION/UL (ref 3.88–5.62)
SODIUM SERPL-SCNC: 139 MMOL/L (ref 136–145)
WBC # BLD AUTO: 8.09 THOUSAND/UL (ref 4.31–10.16)
WBC # BLD AUTO: 9.55 THOUSAND/UL (ref 4.31–10.16)

## 2018-06-29 PROCEDURE — 85025 COMPLETE CBC W/AUTO DIFF WBC: CPT | Performed by: EMERGENCY MEDICINE

## 2018-06-29 PROCEDURE — 85027 COMPLETE CBC AUTOMATED: CPT | Performed by: EMERGENCY MEDICINE

## 2018-06-29 PROCEDURE — 99284 EMERGENCY DEPT VISIT MOD MDM: CPT

## 2018-06-29 PROCEDURE — 72125 CT NECK SPINE W/O DYE: CPT

## 2018-06-29 PROCEDURE — 70450 CT HEAD/BRAIN W/O DYE: CPT

## 2018-06-29 PROCEDURE — 85610 PROTHROMBIN TIME: CPT | Performed by: EMERGENCY MEDICINE

## 2018-06-29 PROCEDURE — 80048 BASIC METABOLIC PNL TOTAL CA: CPT | Performed by: EMERGENCY MEDICINE

## 2018-06-29 PROCEDURE — 85730 THROMBOPLASTIN TIME PARTIAL: CPT | Performed by: EMERGENCY MEDICINE

## 2018-06-29 PROCEDURE — 36415 COLL VENOUS BLD VENIPUNCTURE: CPT | Performed by: EMERGENCY MEDICINE

## 2018-06-29 RX ORDER — LIDOCAINE HYDROCHLORIDE AND EPINEPHRINE 10; 10 MG/ML; UG/ML
20 INJECTION, SOLUTION INFILTRATION; PERINEURAL ONCE
Status: COMPLETED | OUTPATIENT
Start: 2018-06-29 | End: 2018-06-29

## 2018-06-29 RX ORDER — ESCITALOPRAM OXALATE 5 MG/1
5 TABLET ORAL DAILY
COMMUNITY
End: 2019-01-21 | Stop reason: ALTCHOICE

## 2018-06-29 RX ORDER — TRANEXAMIC ACID 100 MG/ML
500 INJECTION, SOLUTION INTRAVENOUS ONCE
Status: COMPLETED | OUTPATIENT
Start: 2018-06-29 | End: 2018-06-29

## 2018-06-29 RX ORDER — BACITRACIN, NEOMYCIN, POLYMYXIN B 400; 3.5; 5 [USP'U]/G; MG/G; [USP'U]/G
1 OINTMENT TOPICAL ONCE
Status: COMPLETED | OUTPATIENT
Start: 2018-06-29 | End: 2018-06-29

## 2018-06-29 RX ADMIN — TRANEXAMIC ACID 500 MG: 100 INJECTION, SOLUTION INTRAVENOUS at 18:45

## 2018-06-29 RX ADMIN — BACITRACIN, NEOMYCIN, POLYMYXIN B 1 SMALL APPLICATION: 400; 3.5; 5 OINTMENT TOPICAL at 19:29

## 2018-06-29 RX ADMIN — LIDOCAINE HYDROCHLORIDE,EPINEPHRINE BITARTRATE 20 ML: 10; .01 INJECTION, SOLUTION INFILTRATION; PERINEURAL at 17:49

## 2018-06-29 NOTE — ED PROVIDER NOTES
History  Chief Complaint   Patient presents with    Head Laceration     Pt reoprts falling from a standing position, denies dizzines, states "I lost my balance", denies LOC, (+) coumadin  80year old male presents for evaluation of fall and head trauma that occurred shortly prior to arrival  Patient was eating dinner, stood up and lost his balance  He denies any symptoms of feeling lightheaded, dizzy or chest pain  He just lost his balance and fell backwards  He hit his head on the wall  Denies LOC but is on coumadin for afib  EMS report patients walker was right next to him but he did not use it  EMS also report a lot of blood at the scene  Unable to get hemostasis with pressure dressings  He was recently started on an antidepressant and is supposed to get his INR checked in 3 days  Prior to Admission Medications   Prescriptions Last Dose Informant Patient Reported? Taking?    Multiple Vitamin-Folic Acid TABS  Self Yes No   Sig: Take 1 tablet by mouth daily   Multiple Vitamins-Minerals (MULTIVITAMIN ADULT PO)  Self Yes No   Sig: Take by mouth daily   acetaminophen (TYLENOL) 325 mg tablet  Self No No   Sig: Take 2 tablets by mouth every 4 (four) hours as needed for mild pain, headaches or fever   ascorbic acid (VITAMIN C) 500 mg tablet  Self Yes No   Sig: Take 500 mg by mouth daily   cholecalciferol (VITAMIN D3) 1,000 units tablet  Self Yes No   Sig: Take 1,000 Units by mouth daily   docusate sodium (COLACE) 100 mg capsule  Self No No   Sig: Take 1 capsule by mouth 2 (two) times a day   escitalopram (LEXAPRO) 5 mg tablet   Yes Yes   Sig: Take 5 mg by mouth daily   ezetimibe-simvastatin (VYTORIN) 10-20 mg per tablet  Self Yes Yes   Sig: Take by mouth   lidocaine (LIDODERM) 5 %  Self No No   Sig: Place 1 patch on the skin daily Remove & Discard patch within 12 hours or as directed by MD   tamsulosin (FLOMAX) 0 4 mg   No Yes   Sig: Take 1 capsule (0 4 mg total) by mouth daily with dinner   warfarin (COUMADIN) 5 mg tablet  Self Yes Yes   Sig: Take 2 5 mg by mouth daily     warfarin (COUMADIN) 5 mg tablet   No Yes   Sig: One tablet daily or as directed by physician   Patient taking differently: 5 mg One tablet daily or as directed by physician       Facility-Administered Medications: None       Past Medical History:   Diagnosis Date    Atrial fibrillation ([facility] )     Atrial fibrillation ([facility] )     Hyperlipidemia        History reviewed  No pertinent surgical history  Family History   Problem Relation Age of Onset    No Known Problems Family      I have reviewed and agree with the history as documented  Social History   Substance Use Topics    Smoking status: Former Smoker    Smokeless tobacco: Never Used    Alcohol use No        Review of Systems   Constitutional: Negative for chills, diaphoresis and fever  HENT: Negative for congestion and rhinorrhea  Eyes: Negative for pain and visual disturbance  Respiratory: Negative for cough, shortness of breath and wheezing  Cardiovascular: Negative for chest pain and leg swelling  Gastrointestinal: Negative for abdominal pain, diarrhea, nausea and vomiting  Genitourinary: Negative for difficulty urinating, dysuria, frequency and urgency  Musculoskeletal: Negative for back pain and neck pain  Skin: Negative for color change and rash  Neurological: Negative for dizziness, syncope, light-headedness, numbness and headaches  All other systems reviewed and are negative        Physical Exam  ED Triage Vitals [06/29/18 1740]   Temperature Pulse Respirations Blood Pressure SpO2   98 5 °F (36 9 °C) 95 20 (!) 187/94 98 %      Temp Source Heart Rate Source Patient Position - Orthostatic VS BP Location FiO2 (%)   Oral Monitor Lying Right arm --      Pain Score       5           Orthostatic Vital Signs  Vitals:    06/29/18 1740 06/29/18 1845 06/29/18 1928 06/29/18 2031   BP: (!) 187/94 152/67 159/96 140/74   Pulse: 95 72 72 72   Patient Position - Orthostatic VS: Lying Lying Lying Lying       Physical Exam   Constitutional: He is oriented to person, place, and time  He appears well-developed and well-nourished  No distress  HENT:   Head: Normocephalic  12 cm U shaped laceration on top left scalp  Eyes: Conjunctivae and EOM are normal    Neck: Normal range of motion  Neck supple  Cardiovascular: Normal rate and regular rhythm  Pulmonary/Chest: Effort normal and breath sounds normal  No respiratory distress  He has no wheezes  He has no rales  Abdominal: Soft  Bowel sounds are normal  There is no tenderness  There is no guarding  Musculoskeletal: Normal range of motion  He exhibits no edema or tenderness  Neurological: He is alert and oriented to person, place, and time  No cranial nerve deficit  Skin: Skin is warm  He is not diaphoretic  No erythema  Psychiatric: He has a normal mood and affect  His behavior is normal    Nursing note and vitals reviewed        ED Medications  Medications   lidocaine-epinephrine (XYLOCAINE/EPINEPHRINE) 1 %-1:100,000 injection 20 mL (20 mL Infiltration Given 6/29/18 1749)   tranexamic acid 100mg/mL (for epistaxis) 500 mg (500 mg Nasal Given 6/29/18 1845)   neomycin-bacitracin-polymyxin b (NEOSPORIN) ointment 1 small application (1 small application Topical Given 6/29/18 1929)       Diagnostic Studies  Results Reviewed     Procedure Component Value Units Date/Time    CBC and Platelet [[id number]]  (Abnormal) Collected:  06/29/18 2029    Lab Status:  Final result Specimen:  Blood from Arm, Left Updated:  06/29/18 2040     WBC 9 55 Thousand/uL      RBC 3 80 (L) Million/uL      Hemoglobin 12 3 g/dL      Hematocrit 36 1 (L) %      MCV 95 fL      MCH 32 4 pg      MCHC 34 1 g/dL      RDW 14 6 %      Platelets 755 Thousands/uL      MPV 9 3 fL     Protime-INR [[id number]]  (Abnormal) Collected:  06/29/18 1803    Lab Status:  Final result Specimen:  Blood from Arm, Left Updated:  06/29/18 1835     Protime 26 0 (H) seconds INR 2 37 (H)    APTT [[id number]]  (Abnormal) Collected:  06/29/18 1803    Lab Status:  Final result Specimen:  Blood from Arm, Left Updated:  06/29/18 1835     PTT 55 (H) seconds     Basic metabolic panel [[id number]]  (Abnormal) Collected:  06/29/18 1803    Lab Status:  Final result Specimen:  Blood from Arm, Left Updated:  06/29/18 1824     Sodium 139 mmol/L      Potassium 4 5 mmol/L      Chloride 102 mmol/L      CO2 29 mmol/L      Anion Gap 8 mmol/L      BUN 17 mg/dL      Creatinine 1 22 mg/dL      Glucose 170 (H) mg/dL      Calcium 9 2 mg/dL      eGFR 50 ml/min/1 73sq m     Narrative:         National Kidney Disease Education Program recommendations are as follows:  GFR calculation is accurate only with a steady state creatinine  Chronic Kidney disease less than 60 ml/min/1 73 sq  meters  Kidney failure less than 15 ml/min/1 73 sq  meters  CBC and differential [[id number]] Collected:  06/29/18 1803    Lab Status:  Final result Specimen:  Blood from Arm, Left Updated:  06/29/18 1815     WBC 8 09 Thousand/uL      RBC 4 46 Million/uL      Hemoglobin 14 3 g/dL      Hematocrit 42 6 %      MCV 96 fL      MCH 32 1 pg      MCHC 33 6 g/dL      RDW 14 6 %      MPV 9 4 fL      Platelets 620 Thousands/uL      nRBC 0 /100 WBCs      Neutrophils Relative 48 %      Lymphocytes Relative 42 %      Monocytes Relative 6 %      Eosinophils Relative 4 %      Basophils Relative 0 %      Neutrophils Absolute 3 90 Thousands/µL      Lymphocytes Absolute 3 38 Thousands/µL      Monocytes Absolute 0 45 Thousand/µL      Eosinophils Absolute 0 33 Thousand/µL      Basophils Absolute 0 03 Thousands/µL                  CT spine cervical without contrast   Final Result by Helen Ansari MD (06/29 1824)      No cervical spine fracture or traumatic malalignment  Workstation performed: [id number]         CT head without contrast   Final Result by Helen Ansari MD (06/29 1810)      No acute intracranial abnormality  Microangiopathic changes  Workstation performed: [id number]               Procedures  Lac Repair  Date/Time: 6/29/2018 7:13 PM  Performed by: Louie Hagen  Authorized by: Louie Hagen   Consent: Verbal consent obtained  Consent given by: patient  Patient understanding: patient states understanding of the procedure being performed  Radiology Images displayed and confirmed  If images not available, report reviewed: imaging studies available  Patient identity confirmed: verbally with patient  Time out: Immediately prior to procedure a "time out" was called to verify the correct patient, procedure, equipment, support staff and site/side marked as required  Body area: head/neck  Location details: scalp  Laceration length: 12 cm  Foreign bodies: no foreign bodies  Tendon involvement: none  Nerve involvement: none  Anesthesia: local infiltration    Anesthesia:  Local Anesthetic: lidocaine 1% with epinephrine  Anesthetic total: 15 mL    Wound Dehiscence:    Secondary closure or dehiscence: complex    Procedure Details:  Preparation: Patient was prepped and draped in the usual sterile fashion  Irrigation solution: saline  Irrigation method: jet lavage  Amount of cleaning: extensive  Debridement: minimal  Skin closure: staples (12 staples)  Subcutaneous closure: 3-0 Vicryl  Number of sutures: 3  Technique: complex  Approximation: close  Approximation difficulty: simple  Dressing: 4x4 sterile gauze and antibiotic ointment  Patient tolerance: Patient tolerated the procedure well with no immediate complications            Phone Consults  ED Phone Contact    ED Course  ED Course as of Jun 29 2136 Fri Jun 29, 2018   1905 INR: (!) 2 37                               MDM  Number of Diagnoses or Management Options  Diagnosis management comments: 80year old male presenting with head laceration  Stat CTH/c spine to assess for ICH  Repair laceration with vicryl to obtain hemostasis and staples   TXA applied to help control bleeding  Repeat CBC  CritCare Time    Disposition  Final diagnoses:   Laceration of head   Fall, initial encounter     Time reflects when diagnosis was documented in both MDM as applicable and the Disposition within this note     Time User Action Codes Description Comment    6/29/2018  8:57 PM Henrietta Dallas Yadira Dasilva Laceration of head     6/29/2018  8:57 PM Henrietta Dallas [W19  Luiza Santillan, initial encounter       ED Disposition     ED Disposition Condition Comment    Discharge  Florene Peer discharge to home/self care  Condition at discharge: Stable        Follow-up Information     Follow up With Specialties Details Why Contact Info Additional Information    Matilde Shone, MD Family Medicine In 1 week For suture removal [address] [facility]    [address] [facility] Emergency Department Emergency Medicine  If symptoms worsen [address] [facility] ED, [address], [place], [address]          Discharge Medication List as of 6/29/2018  9:02 PM      CONTINUE these medications which have NOT CHANGED    Details   escitalopram (LEXAPRO) 5 mg tablet Take 5 mg by mouth daily, Historical Med      ezetimibe-simvastatin (VYTORIN) 10-20 mg per tablet Take by mouth, Historical Med      tamsulosin (FLOMAX) 0 4 mg Take 1 capsule (0 4 mg total) by mouth daily with dinner, Starting Wed 6/6/2018, Normal      !! warfarin (COUMADIN) 5 mg tablet Take 2 5 mg by mouth daily  , Starting Mon 2/13/2017, Historical Med      !! warfarin (COUMADIN) 5 mg tablet One tablet daily or as directed by physician, Normal      acetaminophen (TYLENOL) 325 mg tablet Take 2 tablets by mouth every 4 (four) hours as needed for mild pain, headaches or fever, Starting Fri 12/1/2017, No Print      ascorbic acid (VITAMIN C) 500 mg tablet Take 500 mg by mouth daily, Historical Med      cholecalciferol (VITAMIN D3) 1,000 units tablet Take 1,000 Units by mouth daily, Historical Med      docusate sodium (COLACE) 100 mg capsule Take 1 capsule by mouth 2 (two) times a day, Starting Fri 12/1/2017, No Print      lidocaine (LIDODERM) 5 % Place 1 patch on the skin daily Remove & Discard patch within 12 hours or as directed by MD, Starting Sat 12/2/2017, No Print      Multiple Vitamin-Folic Acid TABS Take 1 tablet by mouth daily, Historical Med      Multiple Vitamins-Minerals (MULTIVITAMIN ADULT PO) Take by mouth daily, Historical Med       !! - Potential duplicate medications found  Please discuss with provider  No discharge procedures on file  ED Provider  Attending physically available and evaluated Luna Guerrero I managed the patient along with the ED Attending      Electronically Signed by         Jie Parekh DO  06/29/18 4249

## 2018-06-29 NOTE — ED ATTENDING ATTESTATION
Mera Villalobos MD, saw and evaluated the patient  I have discussed the patient with the resident/non-physician practitioner and agree with the resident's/non-physician practitioner's findings, Plan of Care, and MDM as documented in the resident's/non-physician practitioner's note, except where noted  All available labs and Radiology studies were reviewed  At this point I agree with the current assessment done in the Emergency Department  I have conducted an independent evaluation of this patient a history and physical is as follows: Fall at assisted living  Head injury  Sustained a scalp laceration  Patient is on Coumadin  He is awake and alert moving all extremities  Large U shaped laceration with some swelling underneath it  Check CT and labs and will need wound care/management/closure  7:00 p m :  A cyst at the resident in stopping the bleeding vessel  There are actually 2 which took some time to localize  Vicryl sutures were used to close and the bleeding stops to some venous oozing  TXA also applied to the area      Critical Care Time  CritCare Time    Procedures

## 2018-06-30 NOTE — DISCHARGE INSTRUCTIONS
Please follow up with your primary care provider in 7-10 days for removal of 12 staples in your scalp  Laceration   WHAT YOU NEED TO KNOW:   A laceration is an injury to the skin and the soft tissue underneath it  Lacerations happen when you are cut or hit by something  They can happen anywhere on the body  DISCHARGE INSTRUCTIONS:   Return to the emergency department if:   · You have heavy bleeding or bleeding that does not stop after 10 minutes of holding firm, direct pressure over the wound  · Your wound opens up  Contact your healthcare provider if:   · You have a fever or chills  · Your laceration is red, warm, or swollen  · You have red streaks on your skin coming from your wound  · You have white or yellow drainage from the wound that smells bad  · You have pain that gets worse, even after treatment  · You have questions or concerns about your condition or care  Medicines:   · Prescription pain medicine  may be given  Ask how to take this medicine safely  · Antibiotics  help treat or prevent a bacterial infection  · Take your medicine as directed  Contact your healthcare provider if you think your medicine is not helping or if you have side effects  Tell him or her if you are allergic to any medicine  Keep a list of the medicines, vitamins, and herbs you take  Include the amounts, and when and why you take them  Bring the list or the pill bottles to follow-up visits  Carry your medicine list with you in case of an emergency  Care for your wound as directed:   · Do not get your wound wet  until your healthcare provider says it is okay  Do not soak your wound in water  Do not go swimming until your healthcare provider says it is okay  Carefully wash the wound with soap and water  Gently pat the area dry or allow it to air dry  · Change your bandages  when they get wet, dirty, or after washing  Apply new, clean bandages as directed   Do not apply elastic bandages or tape too tight  Do not put powders or lotions over your incision  · Apply antibiotic ointment as directed  Your healthcare provider may give you antibiotic ointment to put over your wound if you have stitches  If you have strips of tape over your incision, let them dry up and fall off on their own  If they do not fall off within 14 days, gently remove them  If you have glue over your wound, do not remove or pick at it  If your glue comes off, do not replace it with glue that you have at home  · Check your wound every day for signs of infection such as swelling, redness, or pus  Self-care:   · Apply ice  on your wound for 15 to 20 minutes every hour or as directed  Use an ice pack, or put crushed ice in a plastic bag  Cover it with a towel  Ice helps prevent tissue damage and decreases swelling and pain  · Use a splint as directed  A splint will decrease movement and stress on your wound  It may help it heal faster  A splint may be used for lacerations over joints or areas of your body that bend  Ask your healthcare provider how to apply and remove a splint  · Decrease scarring of your wound  by applying ointments as directed  Do not apply ointments until your healthcare provider says it is okay  You may need to wait until your wound is healed  Ask which ointment to buy and how often to use it  After your wound is healed, use sunscreen over the area when you are out in the sun  You should do this for at least 6 months to 1 year after your injury  Follow up with your healthcare provider as directed: You may need to follow up in 24 to 48 hours to have your wound checked for infection  You will need to return in 3 to 14 days if you have stitches or staples so they can be removed  Care for your wound as directed to prevent infection and help it heal  Write down your questions so you remember to ask them during your visits    © 2017 [address] Information is for End User's use only and may not be sold, redistributed or otherwise used for commercial purposes  All illustrations and images included in CareNotes® are the copyrighted property of [employer]  or [address]  The above information is an  only  It is not intended as medical advice for individual conditions or treatments  Talk to your doctor, nurse or pharmacist before following any medical regimen to see if it is safe and effective for you

## 2018-07-02 ENCOUNTER — TELEPHONE (OUTPATIENT)
Dept: CARDIOLOGY CLINIC | Facility: CLINIC | Age: 83
End: 2018-07-02

## 2018-07-02 ENCOUNTER — ANTICOAG VISIT (OUTPATIENT)
Dept: CARDIOLOGY CLINIC | Facility: CLINIC | Age: 83
End: 2018-07-02

## 2018-07-02 NOTE — TELEPHONE ENCOUNTER
Phone call from patient's daughter, Tiff Letters, [phone]  Patient fell on Friday, 6/29  laceration of head  See er visit  See last ov note feb 2018 with dr Sofia Doherty  Daughter reports patient has fallen 8 times since last nov, of which 3 required er visits  Er physician commeneted to daughter, may need to consider risk of falls and  risk of bleeding with warfarin  Will ask dr Sofia Doherty to call daughter, Tiff Letters at [phone]  Recall ov one year  Will see pcp next week to remove staples from wound

## 2018-07-02 NOTE — TELEPHONE ENCOUNTER
Called dtr and ML    Advised stop warfarin    Start EC ASA 81 mg daily    To call if questions    Falls too frequent    Risk outweighs benefit of warfarin

## 2018-07-02 NOTE — TELEPHONE ENCOUNTER
Placed call to hernan  Left message on answering machine to call office to verify understanding instructions from dr Babatunde Ortiz

## 2018-07-03 ENCOUNTER — ANTICOAG VISIT (OUTPATIENT)
Dept: CARDIOLOGY CLINIC | Facility: CLINIC | Age: 83
End: 2018-07-03

## 2018-07-03 NOTE — TELEPHONE ENCOUNTER
Phone call from patient's daughter, Le Pedraza  Left message verifies understanding to stop warfarin  and will start asa as directed

## 2018-07-23 ENCOUNTER — OFFICE VISIT (OUTPATIENT)
Dept: UROLOGY | Facility: MEDICAL CENTER | Age: 83
End: 2018-07-23
Payer: MEDICARE

## 2018-07-23 VITALS
BODY MASS INDEX: 30.05 KG/M2 | SYSTOLIC BLOOD PRESSURE: 110 MMHG | HEIGHT: 66 IN | DIASTOLIC BLOOD PRESSURE: 62 MMHG | WEIGHT: 187 LBS

## 2018-07-23 DIAGNOSIS — N40.1 BENIGN PROSTATIC HYPERPLASIA WITH LOWER URINARY TRACT SYMPTOMS, SYMPTOM DETAILS UNSPECIFIED: Primary | ICD-10-CM

## 2018-07-23 LAB
SL AMB  POCT GLUCOSE, UA: ABNORMAL
SL AMB LEUKOCYTE ESTERASE,UA: ABNORMAL
SL AMB POCT BILIRUBIN,UA: ABNORMAL
SL AMB POCT BLOOD,UA: ABNORMAL
SL AMB POCT CLARITY,UA: CLEAR
SL AMB POCT COLOR,UA: YELLOW
SL AMB POCT KETONES,UA: ABNORMAL
SL AMB POCT NITRITE,UA: ABNORMAL
SL AMB POCT PH,UA: 6
SL AMB POCT SPECIFIC GRAVITY,UA: 1.01
SL AMB POCT URINE PROTEIN: ABNORMAL
SL AMB POCT UROBILINOGEN: 0.2

## 2018-07-23 PROCEDURE — 99213 OFFICE O/P EST LOW 20 MIN: CPT | Performed by: UROLOGY

## 2018-07-23 PROCEDURE — 81003 URINALYSIS AUTO W/O SCOPE: CPT | Performed by: UROLOGY

## 2018-07-23 RX ORDER — ASPIRIN 81 MG/1
81 TABLET ORAL DAILY
COMMUNITY

## 2018-07-23 NOTE — LETTER
July 23, 2018     Alicia Luz MD  [address]    Patient: Jayden Smith   YOB: 1923   Date of Visit: 7/23/2018       Dear Dr Hoda Harris:    Thank you for referring Lawernce Sanket to me for evaluation  Below are my notes for this consultation  If you have questions, please do not hesitate to call me  I look forward to following your patient along with you           Sincerely,        Regla Goodwin MD        CC: No Recipients

## 2018-07-23 NOTE — PATIENT INSTRUCTIONS
Benign Prostatic Hypertrophy   WHAT YOU NEED TO KNOW:   Benign prostatic hypertrophy (BPH) is a condition that causes your prostate gland to grow larger than normal  The prostate gland is the male sex gland that produces a fluid that is part of semen  It is about the size of a walnut and it is located under the bladder  As the prostate grows, it can squeeze the urethra  This can block urine flow and cause urinary problems  DISCHARGE INSTRUCTIONS:   Medicines:   · Alpha blockers: This medicine relaxes the muscles in your prostate and bladder  It may help you urinate more easily  · 5 alpha reductase inhibitors: These medicines block the production of a hormone that causes the prostate to get larger  It may help slow the growth of the prostate or shrink the prostate  · Take your medicine as directed  Contact your healthcare provider if you think your medicine is not helping or if you have side effects  Tell him or her if you are allergic to any medicine  Keep a list of the medicines, vitamins, and herbs you take  Include the amounts, and when and why you take them  Bring the list or the pill bottles to follow-up visits  Carry your medicine list with you in case of an emergency  Follow up with your healthcare provider as directed:  Write down your questions so you remember to ask them during your visits  Manage BPH:   · Do not let your bladder get too full before you empty it  Urinate when you feel the urge  · Limit alcohol  Do not drink large amounts of any liquid at one time  · Decrease the amount of salt you eat  Examples of salty foods are chips, cured meats, and canned soups  Do not use table salt  · Healthcare providers may tell you not to eat spicy foods such as chilli peppers  This may help you find out if spicy food makes your BPH symptoms worse  · You may have sex if you feel well  Contact your healthcare provider if:   · There is a large amount of blood in your urine  · Your signs and symptoms get worse  · You have a fever  · You have questions or concerns about your condition or care  Seek care immediately if:   · You are unable to urinate  · Your bladder feels very full and painful  © 2017 [address] [employer] [employer] Information is for End User's use only and may not be sold, redistributed or otherwise used for commercial purposes  All illustrations and images included in CareNotes® are the copyrighted property of [employer]  or [employer]  The above information is an  only  It is not intended as medical advice for individual conditions or treatments  Talk to your doctor, nurse or pharmacist before following any medical regimen to see if it is safe and effective for you

## 2018-07-23 NOTE — ASSESSMENT & PLAN NOTE
Patient is satisfied with his voiding pattern on tamsulosin  Other treatment options including the use of anticholinergic medications for urgency and surgical options were discussed with the patient and his daughter  At this point they are content with his voiding pattern  They do not wish him to take other medications  The patient will follow up here on a p r n  basis  He will continue to follow with Dr SANDERS

## 2018-09-07 NOTE — PROGRESS NOTES
REPORT NAME: Patient Visit Summary Report   VISIT DATE: 9/20/2016  VISIT TIME: 9:27 AM EDT  PATIENT NAME: Eduardo Llamas RECORD NUMBER: [id number]  SOCIAL SECURITY NUMBER:   YOB: 1923  AGE: 80  REFERRING PHYSICIAN: Brina Moore MD  SUPERVISING CLINICIAN: Brina Moore MD  HEALTH CARE PROFESSIONAL: Misti AZAR [facility]  PATIENT HOME ADDRESS: [address] [facility], [facility], [place] [address]  PATIENT HOME PHONE: [phone]  DIAGNOSIS 1: Unspecified atrial fibrillation / I48 91  DIAGNOSIS 2:   DIAGNOSIS 3:   DIAGNOSIS 4:   INR RANGE: 2 - 3  INR GOAL: 2 5  TREATMENT START DATE: 3/15/2012  TREATMENT END DATE:   NEXT VISIT: 10/4/2016  [facility]    VISIT RESULTS   ENCOUNTER NUMBER:   TEST LOCATION: [facility]  TEST TYPE: Outside Lab (Venipuncture)  VISIT TYPE: Phone Consult  CURRENT INR: 3 12 PROTIME: 31 5  SPECIMEN COL AND RPT DATE: 9/20/2016 9:27  AM EDT    VITAL SIGNS  PULSE:  B/P:  WEIGHT:  HEIGHT:  TEMP:     CURRENT ANTICOAGULANT DOSING SCHEDULE  DOSE SIZE: 5mg    ANTICOAGULANT TYPE: WARFARIN  DOSING REGIMEN  Sun       Mon Tues Wed Thurs Fri       Sat  Total/Wk  2 5       2 5       2 5       2 5       2 5       2 5       2 5       17 5    PATIENT MEDICATION INSTRUCTION: Yes  PATIENT NUTRITIONAL COUNSELING: No  PATIENT BRUISING INSTRUCTION: Yes      LAST EDUCATION DATE:       PREVIOUS VISIT INFORMATION  VISITDATE   INR Goal  INR   Sun     Mon Tues Wed Thurs Fri  Sat     Total/wk  9/20/2016   2 5       3 12  2 5     2 5     2 5     2 5     2 5     2 5  2 5     17 5  8/23/2016   2 5       2 65  2 5     2 5     2 5     5       2 5     2 5  2 5     20  7/26/2016   2 5       2 9   2 5     2 5     2 5     5       2 5     2 5  2 5     20  6/29/2016   2 5       2 67  2 5     2 5     2 5     5       2 5     2 5  2 5     20    ADDITIONAL PREVIOUS VISIT INFORMATION  VISITDATE   PRIMARY RX               DOSE      CrCl  9/20/2016   WARFARIN 5mg                 8/23/2016   WARFARIN                 5mg                 7/26/2016   WARFARIN                 5mg                 6/29/2016   WARFARIN                 5mg                     OTHER CURRENT MEDICATIONS: WARFARIN      PROGRESS NOTES:     PATIENT INSTRUCTIONS: 9/21/16, tc pt, denies any med changes or bleeding  will decrease dose, 2 5 mg daily, rech 2 weeks, 10/4  shannan  TEST LOCATION: [facility] [address]    Electronically signed by:  Razia Mccord on 9/21/2016 at 9:27 AM EDT Impaired

## 2018-10-02 ENCOUNTER — OFFICE VISIT (OUTPATIENT)
Dept: SLEEP CENTER | Facility: CLINIC | Age: 83
End: 2018-10-02
Payer: MEDICARE

## 2018-10-02 VITALS
HEIGHT: 66 IN | SYSTOLIC BLOOD PRESSURE: 104 MMHG | WEIGHT: 172.2 LBS | HEART RATE: 59 BPM | OXYGEN SATURATION: 93 % | DIASTOLIC BLOOD PRESSURE: 58 MMHG | BODY MASS INDEX: 27.68 KG/M2

## 2018-10-02 DIAGNOSIS — G47.33 OSA (OBSTRUCTIVE SLEEP APNEA): Primary | ICD-10-CM

## 2018-10-02 PROCEDURE — 99214 OFFICE O/P EST MOD 30 MIN: CPT | Performed by: NURSE PRACTITIONER

## 2018-10-02 RX ORDER — THIAMINE MONONITRATE (VIT B1) 100 MG
100 TABLET ORAL DAILY
COMMUNITY
End: 2019-01-21 | Stop reason: ALTCHOICE

## 2018-10-02 NOTE — PATIENT INSTRUCTIONS
1   Continue use of CPAP equipment nightly  2  Continue to clean your equipment, as discussed  3  Contact the Sleep [facility] with any questions or concerns prior to your next visit, as needed  4    Schedule visit for follow-up in 1 year

## 2018-10-02 NOTE — PROGRESS NOTES
Progress Note - [address] Kristopher 89 y o  male   ANGELINA:[birth year], MRN: [id number]  10/2/2018          Follow Up Evaluation / Problem:     Obstructive Sleep Apnea    HPI: Kaushik Ochoa is a 80y o  year old male  He presents with his daughter for follow up of obstructive sleep apnea  His daughter reports that he has been using his CPAP more regularly, up until this past week when he fell out of bed reaching for his CPAP  He states that he does not notice much difference in his daytime sleepiness when using CPAP or not          Current Outpatient Prescriptions:     acetaminophen (TYLENOL) 325 mg tablet, Take 2 tablets by mouth every 4 (four) hours as needed for mild pain, headaches or fever, Disp: 30 tablet, Rfl: 0    ascorbic acid (VITAMIN C) 500 mg tablet, Take 500 mg by mouth daily, Disp: , Rfl:     aspirin (ECOTRIN LOW STRENGTH) 81 mg EC tablet, Take 81 mg by mouth daily, Disp: , Rfl:     escitalopram (LEXAPRO) 5 mg tablet, Take 5 mg by mouth daily, Disp: , Rfl:     ezetimibe-simvastatin (VYTORIN) 10-20 mg per tablet, Take by mouth, Disp: , Rfl:     Multiple Vitamins-Minerals (MULTIVITAMIN ADULT PO), Take by mouth daily, Disp: , Rfl:     thiamine (VITAMIN B1) 100 mg tablet, Take 100 mg by mouth daily, Disp: , Rfl:     cholecalciferol (VITAMIN D3) 1,000 units tablet, Take 1,000 Units by mouth daily, Disp: , Rfl:     docusate sodium (COLACE) 100 mg capsule, Take 1 capsule by mouth 2 (two) times a day (Patient not taking: Reported on 10/2/2018 ), Disp: 10 capsule, Rfl: 0    lidocaine (LIDODERM) 5 %, Place 1 patch on the skin daily Remove & Discard patch within 12 hours or as directed by MD (Patient not taking: Reported on 10/2/2018 ), Disp: 30 patch, Rfl: 0    Multiple Vitamin-Folic Acid TABS, Take 1 tablet by mouth daily, Disp: , Rfl:     tamsulosin (FLOMAX) 0 4 mg, Take 1 capsule (0 4 mg total) by mouth daily with dinner (Patient not taking: Reported on 10/2/2018 ), Disp: 90 capsule, Rfl: 3    warfarin (COUMADIN) 5 mg tablet, Take 2 5 mg by mouth daily  , Disp: , Rfl:     warfarin (COUMADIN) 5 mg tablet, One tablet daily or as directed by physician (Patient not taking: Reported on 10/2/2018 ), Disp: 30 tablet, Rfl: 4    [place] Sleepiness Scale  Sitting and reading: High chance of dozing  Watching TV: Would never doze  Sitting, inactive in a public place (e g  a theatre or a meeting): Slight chance of dozing  As a passenger in a car for an hour without a break: High chance of dozing  Lying down to rest in the afternoon when circumstances permit: High chance of dozing  Sitting and talking to someone: Slight chance of dozing  Sitting quietly after a lunch without alcohol: High chance of dozing  In a car, while stopped for a few minutes in traffic: Would never doze  Total score: 14              Vitals:    10/02/18 1400   BP: 104/58   Pulse: 59   SpO2: 93%   Weight: 78 1 kg (172 lb 3 2 oz)   Height: 5' 6" (1 676 m)       Body mass index is 27 79 kg/m²  EPWORTH SLEEPINESS SCORE  Total score: 14      Past History Since Last Sleep Center Visit:   His sleep routine includes going to bed at 7:30pm   He states that he wakes up multiple times during the night for urination  He was taking Flomax to help with this issue, but did not notice any improvement and stopped the medication  He gets up in the morning between 5:30am and 7:00am   He spends a good portion of the day in his recliner, napping off and on  He had a fall on 9/21/18 when he fell out of bed while reaching for his CPAP  He has suspended use of his equipment since that time, but plans to go back to using it  He had another fall in June, resulting in a laceration wound to his head  He was subsequently switched from warfarin to baby aspirin      The review of systems and following portions of the patient's history were reviewed and updated as appropriate: allergies, current medications, past family history, past medical history, past social history, past surgical history, and problem list         OBJECTIVE    PAP Pressure: Nasal CPAP set to deliver 11 cm of water pressure  DME Provider: [employer]    Physical Exam:     General Appearance:   Alert, cooperative, no distress, appears stated age     Head:   Normocephalic, without obvious abnormality, atraumatic     Eyes:   PERRL, conjunctiva/corneas clear, EOM's intact          Nose:  Nares normal, septum midline, no drainage or sinus tenderness           Throat:  Lips, teeth and gums normal; tongue normal size and  shape and midline mucosa moist and redundant bilaterally, uvula barely visible, tonsils not visualized, Mallampati class 4       Neck:  Supple, symmetrical, trachea midline, no adenopathy; Thyroid: No enlargement, tenderness or nodules; no carotid bruit or JVD     Lungs:      Clear to auscultation bilaterally, respirations unlabored     Heart:   Irregularly irregular rate and rhythm, S1 and S2 normal, grade 1-2 murmur noted       Extremities:  Extremities normal, atraumatic, no cyanosis or edema     Pulses:  2+ and symmetric all extremities     Skin:  Skin color, texture, turgor normal, no rashes or lesions       Neurologic:  Hard of hearing, no focal deficits  Normal strength, sensation throughout       ASSESSMENT / PLAN    1  DYAN (obstructive sleep apnea)  PAP DME Resupply/Reorder           Counseling / Coordination of Care  Total clinic time spent today 25 minutes  Greater than 50% of total time was spent with the patient and / or family counseling and / or coordination of care  A description of the counseling / coordination of care:     Impressions, Diagnostic results, Prognosis, Instructions for management, Risks and benefits of treatment, Patient and family education, Risk factor reductions and Importance of compliance with treatment    Today I reviewed the patient's compliance data    he has been able to use the equipment 83 3% of all days recorded  Average usage was 4 or more hours 63 3% of all days recorded  The estimated AHI is 5 6 abnormal breathing events per hour  Response to treatment has been adequate  It is likely that he does not replace his equipment at times when he goes to the bathroom, decreasing his night time usage to under 4 hours  We discussed using his equipment when he is sleeping in his recliner  He does not feel that this is possible  His daughter cleans his equipment or a regular basis and changes his supplies  Supplies have been ordered for the next year  He will continue using this equipment at the settings noted above for the next 12 months  At that timehe will then return for a routine follow-up evaluation  I have asked him to contact the Sleep [address] [place] [address] if he encounters any difficulties prior to that time  The following instructions have been given to the patient today:    Patient Instructions   1  Continue use of CPAP equipment nightly  2  Continue to clean your equipment, as discussed  3  Contact the Sleep [address] [place] [address] with any questions or concerns prior to your next visit, as needed  4    Schedule visit for follow-up in 1 year        Jatinder eHrrera, [address] [place] [address]

## 2018-11-28 ENCOUNTER — TELEPHONE (OUTPATIENT)
Dept: NEUROLOGY | Facility: CLINIC | Age: 83
End: 2018-11-28

## 2019-01-21 ENCOUNTER — OFFICE VISIT (OUTPATIENT)
Dept: NEUROLOGY | Facility: CLINIC | Age: 84
End: 2019-01-21
Payer: MEDICARE

## 2019-01-21 VITALS
SYSTOLIC BLOOD PRESSURE: 140 MMHG | BODY MASS INDEX: 26.52 KG/M2 | HEART RATE: 71 BPM | HEIGHT: 66 IN | DIASTOLIC BLOOD PRESSURE: 60 MMHG | WEIGHT: 165 LBS

## 2019-01-21 DIAGNOSIS — R29.6 FREQUENT FALLS: Primary | ICD-10-CM

## 2019-01-21 DIAGNOSIS — R41.89 COGNITIVE DECLINE: ICD-10-CM

## 2019-01-21 PROCEDURE — 99214 OFFICE O/P EST MOD 30 MIN: CPT | Performed by: PSYCHIATRY & NEUROLOGY

## 2019-01-21 RX ORDER — CALCIUM CARBONATE 750 MG/1
1 TABLET, CHEWABLE ORAL EVERY 6 HOURS PRN
COMMUNITY

## 2019-01-21 RX ORDER — WITCH HAZEL 50 G/100ML
SOLUTION RECTAL AS NEEDED
COMMUNITY

## 2019-01-21 NOTE — ASSESSMENT & PLAN NOTE
The patient is a 77-year-old man with past medical history significant for obstructive sleep apnea with poor CPAP compliance, vision loss, hearing loss and cognitive decline who presents for evaluation of frequent falls  I am happy to hear that with behavioral modifications and switching to a new walker the patient's frequent falls have resolved  The patient is likely suffering from a multifactorial gait disorder with contributions from his impulsivity, peripheral neuropathy, cerebellar atrophy (seen on CT scan) possible deconditioning and mild parkinsonism  The patient has normal tone, no tremor, but he does have global reduction in spontaneous movements, hypophonia and slight focal bradykinesia without decrement or hesitation  The patient does not have Parkinson's disease based on his history and exam     I would recommend ongoing physical therapy for the patient's gait and balance in addition to appropriate supervision given his impulsivity  I discussed with the patient's daughter the possibility of medication trials or additional workup however given that the patient's symptoms have improved they would like to leave things alone for now, follow up on an as-needed basis  I believe this is appropriate

## 2019-01-21 NOTE — LETTER
January 21, 2019     Donnie Bradford MD  [facility]  [address] [place]    Patient: Lynnea Schwab   YOB: 1923   Date of Visit: 1/21/2019       Dear Dr Marie Valente:    Thank you for referring Madeleine Dolannoe to me for evaluation  Below are my notes for this consultation  If you have questions, please do not hesitate to call me  I look forward to following your patient along with you  Sincerely,        Laila Eckert MD        CC: [facility]  Laila Eckert MD  1/21/2019 12:48 PM  Sign at close encounter  Assessment/Plan:    Cognitive decline  The patient has clear cognitive dysfunction  Most of the family's observation have been around frontal/executive dysfunction  The patient's previous neuro imaging demonstrates global atrophy with some possible medial temporal predominance  Given the patient's age and trouble on the MMSE (17/30) some alzheimer's pathology is likely  Other contributors to the patient's cognitive dysfunction include his sensory depravation (vision and hearing), DYAN with poor CPAP compliance, sleep fragmentation and normal cognitive aging at 80year-old  Again we discussed possible additional workup or medication trials however given how well the patient is doing in his current situation we agreed to hold off on any further interventions for the time being  Frequent falls  The patient is a 44-year-old man with past medical history significant for obstructive sleep apnea with poor CPAP compliance, vision loss, hearing loss and cognitive decline who presents for evaluation of frequent falls  I am happy to hear that with behavioral modifications and switching to a new walker the patient's frequent falls have resolved    The patient is likely suffering from a multifactorial gait disorder with contributions from his impulsivity, peripheral neuropathy, cerebellar atrophy (seen on CT scan) possible deconditioning and mild parkinsonism  The patient has normal tone, no tremor, but he does have global reduction in spontaneous movements, hypophonia and slight focal bradykinesia without decrement or hesitation  The patient does not have Parkinson's disease based on his history and exam     I would recommend ongoing physical therapy for the patient's gait and balance in addition to appropriate supervision given his impulsivity  I discussed with the patient's daughter the possibility of medication trials or additional workup however given that the patient's symptoms have improved they would like to leave things alone for now, follow up on an as-needed basis  I believe this is appropriate  Diagnoses and all orders for this visit:    Frequent falls    Cognitive decline    Other orders  -     calcium carbonate (TUMS EX) 750 mg chewable tablet; Chew 1 tablet every 6 (six) hours as needed for indigestion or heartburn  -     Witch Hazel (HEMORRHOIDAL EX); Apply topically  -     Pramox-PE-Glycerin-Petrolatum (HEMORRHOIDAL MAX STR RE); Insert into the rectum 2 (two) times a day as needed  -     Witch Hazel (HEMORRHOIDAL HYGIENE) 50 % PADS; Apply topically as needed  -     b complex vitamins tablet; Take 1 tablet by mouth daily        Subjective:     Patient ID: Ming Nayak is a 80 y o  male  Presents today with his daughter who provides much of the history    I had the pleasure of seeing your patient, Ming Nayak in the [facility] Clinic at the [address] [place] [address],[address] for Neuroscience  The patient is a 80y o  year old man with past history significant for afib previously on anticoagulation, DYAN with poor CPAP compliance, vision loss and hearing loss and cognitive decline who presents for evaluation of frequent falls  The patient's daughter reports that wall the patient was transitioning from independent living to personal care he had a series of 6 falls, many of which with head strike 6    The patient was on Coumadin during that time and during 1 of these falls the EMTs had a difficult time obtaining hemostasis for a scalp laceration  This prompted the patient to be taken off of Coumadin and switch to aspirin  The patient's last presentation to the ED was at the end of June  He has since completed his transition to personal care and his falls have dramatically improved  The patient has been working with PT and OT in switched from a rolling walker to a 2 wheeled standard walker, since this transition the patient has had no more falls  Last fall was 1 month ago  Regarding the patient's cognition, his daughter reports that he appears to have some word-finding troubles  The patient denies this subjectively  She reports sparse spontaneous language output and a tendency towards brief responses to their questions  His ability to carry out a normal conversation is much better in small groups or and a one-on-one basis  In larger groups he quickly loses track of the conversation  The patient's daughter also describes difficulties with impulse control  Recently had a hard time getting his tie off so he found a pair of scissors and cut off  Some of his impulsivity led to falls because he would get up quickly without his walker and fall over where he would walk very quickly with his rolling walker  This tendency toward impulsivity was 1st noticed about a year ago  He also has difficulty executive dysfunction, as reported by the daughter, leading to his transition from independent living to personal care  The patient was in [employer] [place] [employer] and worked with [employer] leading to his right ear hearing loss  He has hearing aids but they are minimally effective  The patient also has macular degeneration with some vision loss  He is currently living in the [facility]  After he was in the were he entered the [facility] and was a  in [facility] [employer] [facility] for many years  He has 4 children, 3 daughters and 1 son      [address] [place] [address] was reviewed from  which showed global atrophy  Enlarged lateral ventricals and medial temporal lobe atrophy  I reviewed these images with the patient and his daughter  The patient was seen by Dr Noah Kent for sleep apnea and fragmentation in the past   The patient reports he uses his CPAP on and off  These notes and his prior ED notes were reviewed  The following portions of the patient's history were reviewed and updated as appropriate: allergies, current medications, past family history, past medical history, past social history, past surgical history and problem list       Objective:      /60 (BP Location: Right arm, Patient Position: Sitting, Cuff Size: Standard)   Pulse 71   Ht 5' 6" (1 676 m)   Wt 74 8 kg (165 lb)   BMI 26 63 kg/m²          Physical Exam    Neurological Exam    GENERAL MEDICAL EXAMINATION:  General appearance: alert, in no apparent distress  Appropriately dressed and groomed  Conversing and interacting appropriately  Eyes: Sclera are non-injected  Ears, nose, Mouth, Throat: Mucous membranes are moist    Resp: Breathing comfortably on RA   Musculoskeletal: No evidence of deformities  No contractures  No Edema  Skin: No visible rashes  Warm and well perfused  Psych: normal and appropriate affect     NEUROLOGICAL EXAMINATION:  Mental Status: Bradyphrenia  No spontaneous verbal output  Replies in single words or short phrases  There were no paraphasic errors  Speech was not dysarthric  There was no pallilalia noted  Able to follow both midline and appendicular commands  MMSE:    Orientation: 3/10  Registration: 3/3  Attention: 3/5   Recall: 03 (3/3 with cue)  Language:   Drawin/1 (missing segments  Cranial Nerves:  III-IV-VI: Full and conjugate, extra-ocular eye movements in all directions of gaze  No nystagmus or diplopia  Intact smooth pursuit  VII: Face is symmetric at rest and with activation; symmetric speed and excursion with smile  IX-X: Palate elevates symmetrically  XII: No tongue deviation or fasciculations    Motor: Overall  Mildly reduced spontaneous movements  Normal muscle bulk throughout  Normal axial and appendicular tone in the arms and paratonia in the legs  Slight hypomimia was noted  Speech was not hypophonic  There was no tremor noted at rest, with posture or with action  There was no vocal tremor or head tremor  Rapid alternating movements were slow without decrement or hesitations  There were no dyskinesias or dystonia noted  No pronator drift or rebound  No asterixis or myoclonus noted  Reflexes:  deminished throughout     Sensory: normal and symmetric perception of light touch, and temperature  Reduced vibration bellow the knee     Coordination: Finger to nose without dysmetria bilaterally  No intention tremor  Gait:   Rises with some difficulty, using his hands  Posture was mildly stooped  His gait is slow and cautious with a walker  Decreased stride length and step height  Turn completed en bloc  Review of Systems   Constitutional: Positive for fatigue  Negative for appetite change and fever  HENT: Positive for hearing loss  Negative for tinnitus, trouble swallowing and voice change  Hoarseness     Eyes: Negative  Negative for photophobia and pain  Respiratory: Positive for cough  Negative for shortness of breath  Cardiovascular: Positive for palpitations  Gastrointestinal: Negative  Negative for nausea and vomiting  Endocrine: Negative  Negative for cold intolerance and heat intolerance  Erection difficulties  Sexual drive     Genitourinary: Positive for frequency and urgency  Negative for dysuria  Loss of bladder control     Musculoskeletal: Positive for gait problem (falls and balance problems)  Negative for myalgias and neck pain  Skin: Negative  Negative for rash     Neurological: Negative for dizziness, tremors, seizures, syncope, facial asymmetry, speech difficulty, weakness, light-headedness, numbness and headaches  Hematological: Negative  Does not bruise/bleed easily  Psychiatric/Behavioral: Positive for sleep disturbance (increased sleepiness, waking up at night, trouble falling asleep, and snoring)  Negative for confusion and hallucinations  The above ROS was reviewed and updated       Gilda Moulton MD  Medical Director   [facility]  Movement and Memory Specialist       Current Outpatient Prescriptions on File Prior to Visit   Medication Sig Dispense Refill    acetaminophen (TYLENOL) 325 mg tablet Take 2 tablets by mouth every 4 (four) hours as needed for mild pain, headaches or fever (Patient taking differently: Take 500 mg by mouth 2 (two) times a day as needed for mild pain, headaches or fever  ) 30 tablet 0    ascorbic acid (VITAMIN C) 500 mg tablet Take 500 mg by mouth daily      aspirin (ECOTRIN LOW STRENGTH) 81 mg EC tablet Take 81 mg by mouth daily      cholecalciferol (VITAMIN D3) 1,000 units tablet Take 1,000 Units by mouth daily      docusate sodium (COLACE) 100 mg capsule Take 1 capsule by mouth 2 (two) times a day 10 capsule 0    ezetimibe-simvastatin (VYTORIN) 10-20 mg per tablet Take 1 tablet by mouth daily        Multiple Vitamins-Minerals (MULTIVITAMIN ADULT PO) Take by mouth daily      [DISCONTINUED] escitalopram (LEXAPRO) 5 mg tablet Take 5 mg by mouth daily      [DISCONTINUED] lidocaine (LIDODERM) 5 % Place 1 patch on the skin daily Remove & Discard patch within 12 hours or as directed by MD (Patient not taking: Reported on 10/2/2018 ) 30 patch 0    [DISCONTINUED] Multiple Vitamin-Folic Acid TABS Take 1 tablet by mouth daily      [DISCONTINUED] tamsulosin (FLOMAX) 0 4 mg Take 1 capsule (0 4 mg total) by mouth daily with dinner (Patient not taking: Reported on 10/2/2018 ) 90 capsule 3    [DISCONTINUED] thiamine (VITAMIN B1) 100 mg tablet Take 100 mg by mouth daily      [DISCONTINUED] warfarin (COUMADIN) 5 mg tablet Take 2 5 mg by mouth daily        [DISCONTINUED] warfarin (COUMADIN) 5 mg tablet One tablet daily or as directed by physician (Patient not taking: Reported on 10/2/2018 ) 30 tablet 4     No current facility-administered medications on file prior to visit

## 2019-01-21 NOTE — ASSESSMENT & PLAN NOTE
The patient has clear cognitive dysfunction  Most of the family's observation have been around frontal/executive dysfunction  The patient's previous neuro imaging demonstrates global atrophy with some possible medial temporal predominance  Given the patient's age and trouble on the MMSE (17/30) some alzheimer's pathology is likely  Other contributors to the patient's cognitive dysfunction include his sensory depravation (vision and hearing), DYAN with poor CPAP compliance, sleep fragmentation and normal cognitive aging at 80year-old  Again we discussed possible additional workup or medication trials however given how well the patient is doing in his current situation we agreed to hold off on any further interventions for the time being

## 2019-01-21 NOTE — PROGRESS NOTES
Assessment/Plan:    Cognitive decline  The patient has clear cognitive dysfunction  Most of the family's observation have been around frontal/executive dysfunction  The patient's previous neuro imaging demonstrates global atrophy with some possible medial temporal predominance  Given the patient's age and trouble on the MMSE (17/30) some alzheimer's pathology is likely  Other contributors to the patient's cognitive dysfunction include his sensory depravation (vision and hearing), DYAN with poor CPAP compliance, sleep fragmentation and normal cognitive aging at 80year-old  Again we discussed possible additional workup or medication trials however given how well the patient is doing in his current situation we agreed to hold off on any further interventions for the time being  Frequent falls  The patient is a 51-year-old man with past medical history significant for obstructive sleep apnea with poor CPAP compliance, vision loss, hearing loss and cognitive decline who presents for evaluation of frequent falls  I am happy to hear that with behavioral modifications and switching to a new walker the patient's frequent falls have resolved  The patient is likely suffering from a multifactorial gait disorder with contributions from his impulsivity, peripheral neuropathy, cerebellar atrophy (seen on CT scan) possible deconditioning and mild parkinsonism  The patient has normal tone, no tremor, but he does have global reduction in spontaneous movements, hypophonia and slight focal bradykinesia without decrement or hesitation  The patient does not have Parkinson's disease based on his history and exam     I would recommend ongoing physical therapy for the patient's gait and balance in addition to appropriate supervision given his impulsivity      I discussed with the patient's daughter the possibility of medication trials or additional workup however given that the patient's symptoms have improved they would like to leave things alone for now, follow up on an as-needed basis  I believe this is appropriate  Diagnoses and all orders for this visit:    Frequent falls    Cognitive decline    Other orders  -     calcium carbonate (TUMS EX) 750 mg chewable tablet; Chew 1 tablet every 6 (six) hours as needed for indigestion or heartburn  -     Witch Hazel (HEMORRHOIDAL EX); Apply topically  -     Pramox-PE-Glycerin-Petrolatum (HEMORRHOIDAL MAX STR RE); Insert into the rectum 2 (two) times a day as needed  -     Witch Hazel (HEMORRHOIDAL HYGIENE) 50 % PADS; Apply topically as needed  -     b complex vitamins tablet; Take 1 tablet by mouth daily        Subjective:     Patient ID: Cathy Harden is a 80 y o  male  Presents today with his daughter who provides much of the history    I had the pleasure of seeing your patient, Cathy Harden in the [facility] at the [facility]  The patient is a 80y o  year old man with past history significant for afib previously on anticoagulation, DYAN with poor CPAP compliance, vision loss and hearing loss and cognitive decline who presents for evaluation of frequent falls  The patient's daughter reports that wall the patient was transitioning from independent living to personal care he had a series of 6 falls, many of which with head strike 6  The patient was on Coumadin during that time and during 1 of these falls the EMTs had a difficult time obtaining hemostasis for a scalp laceration  This prompted the patient to be taken off of Coumadin and switch to aspirin  The patient's last presentation to the ED was at the end of June  He has since completed his transition to personal care and his falls have dramatically improved  The patient has been working with PT and OT in switched from a rolling walker to a 2 wheeled standard walker, since this transition the patient has had no more falls  Last fall was 1 month ago      Regarding the patient's cognition, his daughter reports that he appears to have some word-finding troubles  The patient denies this subjectively  She reports sparse spontaneous language output and a tendency towards brief responses to their questions  His ability to carry out a normal conversation is much better in small groups or and a one-on-one basis  In larger groups he quickly loses track of the conversation  The patient's daughter also describes difficulties with impulse control  Recently had a hard time getting his tie off so he found a pair of scissors and cut off  Some of his impulsivity led to falls because he would get up quickly without his walker and fall over where he would walk very quickly with his rolling walker  This tendency toward impulsivity was 1st noticed about a year ago  He also has difficulty executive dysfunction, as reported by the daughter, leading to his transition from independent living to personal care  The patient was in [place] and worked with [employer] leading to his right ear hearing loss  He has hearing aids but they are minimally effective  The patient also has macular degeneration with some vision loss  He is currently living in the [facility]  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  He has 4 children, 3 daughters and 1 son  [facility] June which showed global atrophy  Enlarged lateral ventricals and medial temporal lobe atrophy  I reviewed these images with the patient and his daughter  The patient was seen by Dr Austin Boykin for sleep apnea and fragmentation in the past   The patient reports he uses his CPAP on and off  These notes and his prior ED notes were reviewed          The following portions of the patient's history were reviewed and updated as appropriate: allergies, current medications, past family history, past medical history, past social history, past surgical history and problem list       Objective:      /60 (BP Location: Right arm, Patient Position: Sitting, Cuff Size: Standard)   Pulse 71   Ht 5' 6" (1 676 m)   Wt 74 8 kg (165 lb)   BMI 26 63 kg/m²         Physical Exam    Neurological Exam    GENERAL MEDICAL EXAMINATION:  General appearance: alert, in no apparent distress  Appropriately dressed and groomed  Conversing and interacting appropriately  Eyes: Sclera are non-injected  Ears, nose, Mouth, Throat: Mucous membranes are moist    Resp: Breathing comfortably on RA   Musculoskeletal: No evidence of deformities  No contractures  No Edema  Skin: No visible rashes  Warm and well perfused  Psych: normal and appropriate affect     NEUROLOGICAL EXAMINATION:  Mental Status: Bradyphrenia  No spontaneous verbal output  Replies in single words or short phrases  There were no paraphasic errors  Speech was not dysarthric  There was no pallilalia noted  Able to follow both midline and appendicular commands  MMSE:    Orientation: 3/10  Registration: 3/3  Attention: 3/5   Recall: 0/3 (3/3 with cue)  Language:   Drawin (missing segments  Cranial Nerves:  III-IV-VI: Full and conjugate, extra-ocular eye movements in all directions of gaze  No nystagmus or diplopia  Intact smooth pursuit  VII: Face is symmetric at rest and with activation; symmetric speed and excursion with smile  IX-X: Palate elevates symmetrically  XII: No tongue deviation or fasciculations    Motor: Overall  Mildly reduced spontaneous movements  Normal muscle bulk throughout  Normal axial and appendicular tone in the arms and paratonia in the legs  Slight hypomimia was noted  Speech was not hypophonic  There was no tremor noted at rest, with posture or with action  There was no vocal tremor or head tremor  Rapid alternating movements were slow without decrement or hesitations  There were no dyskinesias or dystonia noted  No pronator drift or rebound   No asterixis or myoclonus noted  Reflexes:  deminished throughout     Sensory: normal and symmetric perception of light touch, and temperature  Reduced vibration bellow the knee     Coordination: Finger to nose without dysmetria bilaterally  No intention tremor  Gait:   Rises with some difficulty, using his hands  Posture was mildly stooped  His gait is slow and cautious with a walker  Decreased stride length and step height  Turn completed en bloc  Review of Systems   Constitutional: Positive for fatigue  Negative for appetite change and fever  HENT: Positive for hearing loss  Negative for tinnitus, trouble swallowing and voice change  Hoarseness     Eyes: Negative  Negative for photophobia and pain  Respiratory: Positive for cough  Negative for shortness of breath  Cardiovascular: Positive for palpitations  Gastrointestinal: Negative  Negative for nausea and vomiting  Endocrine: Negative  Negative for cold intolerance and heat intolerance  Erection difficulties  Sexual drive     Genitourinary: Positive for frequency and urgency  Negative for dysuria  Loss of bladder control     Musculoskeletal: Positive for gait problem (falls and balance problems)  Negative for myalgias and neck pain  Skin: Negative  Negative for rash  Neurological: Negative for dizziness, tremors, seizures, syncope, facial asymmetry, speech difficulty, weakness, light-headedness, numbness and headaches  Hematological: Negative  Does not bruise/bleed easily  Psychiatric/Behavioral: Positive for sleep disturbance (increased sleepiness, waking up at night, trouble falling asleep, and snoring)  Negative for confusion and hallucinations  The above ROS was reviewed and updated       Jeremi Claire MD  Medical Director   [facility]  [facility]ment [facility] Memory Specialist       Current Outpatient Prescriptions on File Prior to Visit   Medication Sig Dispense Refill    acetaminophen (TYLENOL) 325 mg tablet Take 2 tablets by mouth every 4 (four) hours as needed for mild pain, headaches or fever (Patient taking differently: Take 500 mg by mouth 2 (two) times a day as needed for mild pain, headaches or fever  ) 30 tablet 0    ascorbic acid (VITAMIN C) 500 mg tablet Take 500 mg by mouth daily      aspirin (ECOTRIN LOW STRENGTH) 81 mg EC tablet Take 81 mg by mouth daily      cholecalciferol (VITAMIN D3) 1,000 units tablet Take 1,000 Units by mouth daily      docusate sodium (COLACE) 100 mg capsule Take 1 capsule by mouth 2 (two) times a day 10 capsule 0    ezetimibe-simvastatin (VYTORIN) 10-20 mg per tablet Take 1 tablet by mouth daily        Multiple Vitamins-Minerals (MULTIVITAMIN ADULT PO) Take by mouth daily      [DISCONTINUED] escitalopram (LEXAPRO) 5 mg tablet Take 5 mg by mouth daily      [DISCONTINUED] lidocaine (LIDODERM) 5 % Place 1 patch on the skin daily Remove & Discard patch within 12 hours or as directed by MD (Patient not taking: Reported on 10/2/2018 ) 30 patch 0    [DISCONTINUED] Multiple Vitamin-Folic Acid TABS Take 1 tablet by mouth daily      [DISCONTINUED] tamsulosin (FLOMAX) 0 4 mg Take 1 capsule (0 4 mg total) by mouth daily with dinner (Patient not taking: Reported on 10/2/2018 ) 90 capsule 3    [DISCONTINUED] thiamine (VITAMIN B1) 100 mg tablet Take 100 mg by mouth daily      [DISCONTINUED] warfarin (COUMADIN) 5 mg tablet Take 2 5 mg by mouth daily        [DISCONTINUED] warfarin (COUMADIN) 5 mg tablet One tablet daily or as directed by physician (Patient not taking: Reported on 10/2/2018 ) 30 tablet 4     No current facility-administered medications on file prior to visit

## 2019-03-29 ENCOUNTER — OFFICE VISIT (OUTPATIENT)
Dept: CARDIOLOGY CLINIC | Facility: CLINIC | Age: 84
End: 2019-03-29
Payer: MEDICARE

## 2019-03-29 VITALS
DIASTOLIC BLOOD PRESSURE: 60 MMHG | BODY MASS INDEX: 26.68 KG/M2 | HEART RATE: 73 BPM | SYSTOLIC BLOOD PRESSURE: 100 MMHG | HEIGHT: 66 IN | WEIGHT: 166 LBS

## 2019-03-29 DIAGNOSIS — I49.5 SICK SINUS SYNDROME (HCC): ICD-10-CM

## 2019-03-29 DIAGNOSIS — E78.2 MIXED HYPERLIPIDEMIA: ICD-10-CM

## 2019-03-29 DIAGNOSIS — I48.21 PERMANENT ATRIAL FIBRILLATION (HCC): Primary | ICD-10-CM

## 2019-03-29 PROCEDURE — 93000 ELECTROCARDIOGRAM COMPLETE: CPT | Performed by: INTERNAL MEDICINE

## 2019-03-29 PROCEDURE — 99213 OFFICE O/P EST LOW 20 MIN: CPT | Performed by: INTERNAL MEDICINE

## 2019-03-29 NOTE — PROGRESS NOTES
Cardiology Follow Up    Kathie Keane  8/25/1923  [id number]  [address] [id number] [phone]    Reason for visit: One year FU for chronic AFIB (off warfarin due to multiple falls), HLP and SSS    1  Permanent atrial fibrillation (HCC)  POCT ECG   2  Sick sinus syndrome ([address] )     3  Mixed hyperlipidemia         Interval History: The patient is now at [facility] He sleeps a lot and doesn't verbalize much     Patient does get some dyspnea on exertion  He denies chest pain, palpitations, edema or lightheadedness      Patient Active Problem List   Diagnosis    Atrial fibrillation ([facility] )    Sick sinus syndrome ([facility] )    Closed traumatic displaced fracture of one rib of left side    Frequent falls    Cognitive decline    Hyperlipidemia    Benign prostatic hyperplasia without lower urinary tract symptoms    Urinary frequency    Fall    DYAN (obstructive sleep apnea)     Past Medical History:   Diagnosis Date    Atrial fibrillation (HCC)     Atrial fibrillation (HCC)     Hyperlipidemia      Social History     Socioeconomic History    Marital status: /Civil Union     Spouse name: Not on file    Number of children: Not on file    Years of education: Not on file    Highest education level: Not on file   Occupational History    Not on file   Social Needs    Financial resource strain: Not on file    Food insecurity:     Worry: Not on file     Inability: Not on file    Transportation needs:     Medical: Not on file     Non-medical: Not on file   Tobacco Use    Smoking status: Former Smoker    Smokeless tobacco: Never Used   Substance and Sexual Activity    Alcohol use: No    Drug use: No    Sexual activity: Not on file   Lifestyle    Physical activity:     Days per week: Not on file     Minutes per session: Not on file    Stress: Not on file   Relationships    Social connections:     Talks on phone: Not on file     Gets together: Not on file     Attends Spiritism service: Not on file     Active member of club or organization: Not on file     Attends meetings of clubs or organizations: Not on file     Relationship status: Not on file    Intimate partner violence:     Fear of current or ex partner: Not on file     Emotionally abused: Not on file     Physically abused: Not on file     Forced sexual activity: Not on file   Other Topics Concern    Not on file   Social History Narrative    Not on file      Family History   Problem Relation Age of Onset    No Known Problems Mother     Dementia Father     No Known Problems Sister     No Known Problems Brother     No Known Problems Brother      No past surgical history on file      Current Outpatient Medications:     acetaminophen (TYLENOL) 325 mg tablet, Take 2 tablets by mouth every 4 (four) hours as needed for mild pain, headaches or fever (Patient taking differently: Take 500 mg by mouth 2 (two) times a day as needed for mild pain, headaches or fever  ), Disp: 30 tablet, Rfl: 0    ascorbic acid (VITAMIN C) 500 mg tablet, Take 500 mg by mouth daily, Disp: , Rfl:     aspirin (ECOTRIN LOW STRENGTH) 81 mg EC tablet, Take 81 mg by mouth daily, Disp: , Rfl:     b complex vitamins tablet, Take 1 tablet by mouth daily, Disp: , Rfl:     calcium carbonate (TUMS EX) 750 mg chewable tablet, Chew 1 tablet every 6 (six) hours as needed for indigestion or heartburn, Disp: , Rfl:     cholecalciferol (VITAMIN D3) 1,000 units tablet, Take 1,000 Units by mouth daily, Disp: , Rfl:     docusate sodium (COLACE) 100 mg capsule, Take 1 capsule by mouth 2 (two) times a day, Disp: 10 capsule, Rfl: 0    ezetimibe-simvastatin (VYTORIN) 10-20 mg per tablet, Take 1 tablet by mouth daily  , Disp: , Rfl:     Multiple Vitamins-Minerals (MULTIVITAMIN ADULT PO), Take by mouth daily, Disp: , Rfl:     Pramox-PE-Glycerin-Petrolatum (HEMORRHOIDAL MAX STR RE), Insert into the rectum 2 (two) times a day as needed, Disp: , Rfl:     Witch Hazel (HEMORRHOIDAL EX), Apply topically, Disp: , Rfl:     Witch Hazel (HEMORRHOIDAL HYGIENE) 50 % PADS, Apply topically as needed, Disp: , Rfl:   No Known Allergies      Review of Systems:  Review of Systems   Constitutional: Positive for fatigue  Negative for appetite change and unexpected weight change  Respiratory: Positive for cough and shortness of breath  Negative for wheezing  Cardiovascular: Negative for chest pain, palpitations and leg swelling  Gastrointestinal: Negative for blood in stool, constipation and diarrhea  Genitourinary: Positive for frequency  Musculoskeletal: Positive for gait problem  Negative for arthralgias and back pain  Neurological: Negative for dizziness and light-headedness  Physical Exam:  Vitals:    03/29/19 1011   BP: 100/60   Pulse: 73       Physical Exam   Constitutional: He appears well-developed and well-nourished  No distress  Not very interactive   HENT:   Head: Normocephalic and atraumatic  Mouth/Throat: Oropharynx is clear and moist  No oropharyngeal exudate  Eyes: Conjunctivae are normal  No scleral icterus  Neck: Neck supple  Normal carotid pulses and no JVD present  Carotid bruit is not present  No thyromegaly present  Cardiovascular: Normal rate  An irregularly irregular rhythm present  Exam reveals no gallop and no friction rub  No murmur heard  Pulses:       Dorsalis pedis pulses are 0 on the right side, and 0 on the left side  Posterior tibial pulses are 0 on the right side, and 0 on the left side  Pulmonary/Chest: He has no decreased breath sounds  He has no wheezes  He has no rhonchi  He has rales in the right middle field, the right lower field, the left middle field and the left lower field  Abdominal: Soft  He exhibits no mass  There is no hepatosplenomegaly  There is no tenderness  Musculoskeletal: He exhibits no edema  Discussion/Summary:  1  Permanent AFIB  [facility] Rate well controlled without specific rate control medications  No longer on warfarin due to falls  He is taking aspirin  2  Sick sinus syndrome  Not requiring rate control medications  No evidence for bradycardia arrhythmias  3  Mixed hyperlipidemia-on ezetimibe/simvastatin-benefit very questionable in this patient with significant decline-will stop this  4  SALAS    Does have bilateral rales and XR supports interstitial lung disease    Not present on prior XRAYS    ?  Aspiration        FU one year      Charles Wilcox MD

## 2019-09-30 ENCOUNTER — TELEPHONE (OUTPATIENT)
Dept: SLEEP CENTER | Facility: CLINIC | Age: 84
End: 2019-09-30

## 2019-09-30 NOTE — TELEPHONE ENCOUNTER
Pts daughter Cesia Mcnair called explaining her father can no longer speak for himself and she does not think this appointment on 10/15/19 is needed, pt is currently in the care of [address] with Doctor Mehran Zeng, She also stated she was told he is using the machine every night , I did explain the yearly compliance appointment is usually recommended for supplies, not sure how you would like to proceed , she would like a call back / gm

## 2019-09-30 NOTE — TELEPHONE ENCOUNTER
LM for patient's daughter to let her know that Dr Moe Li can order the supplies needed for the CPAP through the [employer] company  I requested that she verify this with him and call back to cancel the appointment once she is sure it will be continued through his care

## 2020-04-27 ENCOUNTER — TELEPHONE (OUTPATIENT)
Dept: UROLOGY | Facility: MEDICAL CENTER | Age: 85
End: 2020-04-27

## 2020-04-27 DIAGNOSIS — R31.0 GROSS HEMATURIA: Primary | ICD-10-CM

## 2020-08-04 NOTE — ED NOTES
Patient transported to [address] [place] [address]  11/16/17 7299 Pt discharged. Pt verbalized understanding of discharge instructions and script. Pt walked out per self. Pt in stable condition.      Josh Sam, CM  19/03/56 [id number]

## 2023-06-07 NOTE — PROGRESS NOTES
REPORT NAME: Progress Notes Report  VISIT DATE: 5/18/2017  VISIT TIME: 3:17 PM EDT  PATIENT NAME: Eduardo Valle RECORD NUMBER: [id number]  YOB: 1923  AGE: 80  REFERRING PHYSICIAN: Chapito Rhoades MD  SUPERVISING CLINICIAN: Chapito Rhoades MD  HEALTH CARE PROVIDER: Claire Downs  PATIENT HOME ADDRESS: [address] [address]  PATIENT HOME PHONE: [phone]  SOCIAL SECURITY NUMBER:   DIAGNOSIS 1: Unspecified atrial fibrillation / [id number]  DIAGNOSIS 2:   INR RANGE: 2 - 3  INR GOAL: 2 5  TREATMENT START DATE: 3/15/2012  TREATMENT END DATE:   NEXT VISIT: 6/15/2017  [facility]  VISIT RESULTS  ENCOUNTER NUMBER:   TEST LOCATION: [facility]  TEST TYPE: Outside Lab (Venipuncture)  VISIT TYPE: Phone Consult  CURRENT INR: 1 82 PROTIME: 21 2  SPECIMEN COL AND RPT DATE: 5/18/2017 3:17  PM EDT  VITAL SIGNS  PULSE:  BP: / WEIGHT:  HEIGHT:  TEMP:   CURRENT ANTICOAGULANT DOSING SCHEDULE  DOSE SIZE: 5mg    ANTICOAGULANT TYPE: WARFARIN  DOSING REGIMEN  Sun       Mon Tues Wed Thurs Fri       Sat  Total/Wk  2 5       2 5       2 5       2 5       2 5       2 5       2 5       17 5  PATIENT MEDICATION INSTRUCTION: Yes  PATIENT NUTRITIONAL COUNSELING: No  PATIENT BRUISING INSTRUCTION: Yes  LAST EDUCATION DATE:   PREVIOUS VISIT INFORMATION  VISITDATE  INRGoal INR   Sun    Mon Tues Wed Thurs Fri    Sat  Total/wk  5/18/2017   2 5     1 82  2 5    2 5    2 5    2 5    2 5    2 5    2 5  17 5  4/20/2017   2 5     2 1   0      0      0      0      0      0      0  0  3/23/2017   2 5     2 05  2 5    2 5    2 5    2 5    2 5    2 5    2 5  17 5  2/23/2017   2 5     2 24  2 5    2 5    2 5    2 5    2 5    2 5    2 5  17 5  ADDITIONAL PREVIOUS VISIT INFORMATION  VISITDATE   PRIMARY RX               DOSE      CrCl  5/18/2017   WARFARIN                 5mg  4/20/2017   WARFARIN                 5mg  3/23/2017   WARFARIN 5mg  2/23/2017   WARFARIN                 5mg  OTHER CURRENT MEDICATIONS:  WARFARIN  PROGRESS NOTES:   PATIENT INSTRUCTIONS: 5/18/17, tc pt, may have missed a dose this week  will take 5 mg in place of 2 5 mg , then resume 2 5 mg daily, rech, 4  weeks, 6/15  shannan  TEST LOCATION: [address] [address], , ,   INBOUND LAB DATA:  Lab       Lab Value Col Date                 Rpt Date                 Lab  Reference Range  Electronically signed by:  Damian Mccord on 5/18/2017 3:17 PM EDT .

## 2023-08-27 NOTE — RESULT NOTES
Verified Results  (1) PT WITH INR [id number] 08:18AM Katherine Rice     Test Name Result Flag Reference   INR 2 93 H 0 86-1 16   PT 29 0 seconds H 11 8-14 1 Home